# Patient Record
Sex: FEMALE | Race: OTHER | HISPANIC OR LATINO | URBAN - METROPOLITAN AREA
[De-identification: names, ages, dates, MRNs, and addresses within clinical notes are randomized per-mention and may not be internally consistent; named-entity substitution may affect disease eponyms.]

---

## 2019-07-12 PROBLEM — Z00.00 ENCOUNTER FOR PREVENTIVE HEALTH EXAMINATION: Status: ACTIVE | Noted: 2019-07-12

## 2019-07-15 ENCOUNTER — OUTPATIENT (OUTPATIENT)
Dept: OUTPATIENT SERVICES | Facility: HOSPITAL | Age: 64
LOS: 1 days | End: 2019-07-15

## 2019-07-15 VITALS
HEART RATE: 69 BPM | WEIGHT: 138.01 LBS | DIASTOLIC BLOOD PRESSURE: 82 MMHG | RESPIRATION RATE: 14 BRPM | HEIGHT: 58.5 IN | TEMPERATURE: 98 F | OXYGEN SATURATION: 98 % | SYSTOLIC BLOOD PRESSURE: 120 MMHG

## 2019-07-15 DIAGNOSIS — Z87.19 PERSONAL HISTORY OF OTHER DISEASES OF THE DIGESTIVE SYSTEM: Chronic | ICD-10-CM

## 2019-07-15 DIAGNOSIS — Z98.890 OTHER SPECIFIED POSTPROCEDURAL STATES: Chronic | ICD-10-CM

## 2019-07-15 DIAGNOSIS — Z12.11 ENCOUNTER FOR SCREENING FOR MALIGNANT NEOPLASM OF COLON: ICD-10-CM

## 2019-07-15 DIAGNOSIS — R52 PAIN, UNSPECIFIED: ICD-10-CM

## 2019-07-15 LAB
ANION GAP SERPL CALC-SCNC: 8 MMO/L — SIGNIFICANT CHANGE UP (ref 7–14)
BUN SERPL-MCNC: 10 MG/DL — SIGNIFICANT CHANGE UP (ref 7–23)
CALCIUM SERPL-MCNC: 9.3 MG/DL — SIGNIFICANT CHANGE UP (ref 8.4–10.5)
CHLORIDE SERPL-SCNC: 108 MMOL/L — HIGH (ref 98–107)
CO2 SERPL-SCNC: 26 MMOL/L — SIGNIFICANT CHANGE UP (ref 22–31)
CREAT SERPL-MCNC: 0.67 MG/DL — SIGNIFICANT CHANGE UP (ref 0.5–1.3)
GLUCOSE SERPL-MCNC: 82 MG/DL — SIGNIFICANT CHANGE UP (ref 70–99)
HCT VFR BLD CALC: 37.9 % — SIGNIFICANT CHANGE UP (ref 34.5–45)
HGB BLD-MCNC: 11.5 G/DL — SIGNIFICANT CHANGE UP (ref 11.5–15.5)
MCHC RBC-ENTMCNC: 25.2 PG — LOW (ref 27–34)
MCHC RBC-ENTMCNC: 30.3 % — LOW (ref 32–36)
MCV RBC AUTO: 82.9 FL — SIGNIFICANT CHANGE UP (ref 80–100)
NRBC # FLD: 0 K/UL — SIGNIFICANT CHANGE UP (ref 0–0)
PLATELET # BLD AUTO: 244 K/UL — SIGNIFICANT CHANGE UP (ref 150–400)
PMV BLD: 10.5 FL — SIGNIFICANT CHANGE UP (ref 7–13)
POTASSIUM SERPL-MCNC: 4 MMOL/L — SIGNIFICANT CHANGE UP (ref 3.5–5.3)
POTASSIUM SERPL-SCNC: 4 MMOL/L — SIGNIFICANT CHANGE UP (ref 3.5–5.3)
RBC # BLD: 4.57 M/UL — SIGNIFICANT CHANGE UP (ref 3.8–5.2)
RBC # FLD: 14.8 % — HIGH (ref 10.3–14.5)
SODIUM SERPL-SCNC: 142 MMOL/L — SIGNIFICANT CHANGE UP (ref 135–145)
WBC # BLD: 4.7 K/UL — SIGNIFICANT CHANGE UP (ref 3.8–10.5)
WBC # FLD AUTO: 4.7 K/UL — SIGNIFICANT CHANGE UP (ref 3.8–10.5)

## 2019-07-15 RX ORDER — OMEGA-3 ACID ETHYL ESTERS 1 G
0 CAPSULE ORAL
Qty: 0 | Refills: 0 | DISCHARGE

## 2019-07-15 NOTE — H&P PST ADULT - NSICDXPASTMEDICALHX_GEN_ALL_CORE_FT
PAST MEDICAL HISTORY:  Fibroid uterus 20 years agox 2    H/O intestinal obstruction did not require surgery -- had negative colonoscopy

## 2019-07-15 NOTE — H&P PST ADULT - NSICDXPASTSURGICALHX_GEN_ALL_CORE_FT
PAST SURGICAL HISTORY:  H/O induced  x 4    H/O intestinal obstruction negative colonoscopy -- obstructions resolved on its own

## 2019-07-15 NOTE — H&P PST ADULT - HISTORY OF PRESENT ILLNESS
This is a 64 y/o female who presents with h/o colonoscopy 15 years ago due to intestinal obstruction which resolved on its own. Scheduled for colonoscopy on 7-16-19

## 2019-08-10 PROBLEM — Z87.19 PERSONAL HISTORY OF OTHER DISEASES OF THE DIGESTIVE SYSTEM: Chronic | Status: ACTIVE | Noted: 2019-07-15

## 2019-08-10 PROBLEM — D25.9 LEIOMYOMA OF UTERUS, UNSPECIFIED: Chronic | Status: ACTIVE | Noted: 2019-07-15

## 2019-08-13 ENCOUNTER — OUTPATIENT (OUTPATIENT)
Dept: OUTPATIENT SERVICES | Facility: HOSPITAL | Age: 64
LOS: 1 days | Discharge: HOME | End: 2019-08-13

## 2019-08-13 DIAGNOSIS — Z98.890 OTHER SPECIFIED POSTPROCEDURAL STATES: Chronic | ICD-10-CM

## 2019-08-13 DIAGNOSIS — Z12.11 ENCOUNTER FOR SCREENING FOR MALIGNANT NEOPLASM OF COLON: ICD-10-CM

## 2019-08-13 DIAGNOSIS — Z87.19 PERSONAL HISTORY OF OTHER DISEASES OF THE DIGESTIVE SYSTEM: Chronic | ICD-10-CM

## 2025-01-26 ENCOUNTER — NON-APPOINTMENT (OUTPATIENT)
Age: 70
End: 2025-01-26

## 2025-01-26 ENCOUNTER — INPATIENT (INPATIENT)
Facility: HOSPITAL | Age: 70
LOS: 3 days | Discharge: HOME | DRG: 871 | End: 2025-01-30
Attending: STUDENT IN AN ORGANIZED HEALTH CARE EDUCATION/TRAINING PROGRAM | Admitting: HOSPITALIST
Payer: MEDICARE

## 2025-01-26 VITALS
HEART RATE: 93 BPM | TEMPERATURE: 98 F | OXYGEN SATURATION: 97 % | RESPIRATION RATE: 18 BRPM | SYSTOLIC BLOOD PRESSURE: 116 MMHG | WEIGHT: 130.07 LBS | DIASTOLIC BLOOD PRESSURE: 75 MMHG

## 2025-01-26 DIAGNOSIS — Z98.890 OTHER SPECIFIED POSTPROCEDURAL STATES: Chronic | ICD-10-CM

## 2025-01-26 DIAGNOSIS — Z87.19 PERSONAL HISTORY OF OTHER DISEASES OF THE DIGESTIVE SYSTEM: Chronic | ICD-10-CM

## 2025-01-26 DIAGNOSIS — K56.609 UNSPECIFIED INTESTINAL OBSTRUCTION, UNSPECIFIED AS TO PARTIAL VERSUS COMPLETE OBSTRUCTION: Chronic | ICD-10-CM

## 2025-01-26 DIAGNOSIS — A41.9 SEPSIS, UNSPECIFIED ORGANISM: ICD-10-CM

## 2025-01-26 DIAGNOSIS — M54.2 CERVICALGIA: ICD-10-CM

## 2025-01-26 DIAGNOSIS — Z29.9 ENCOUNTER FOR PROPHYLACTIC MEASURES, UNSPECIFIED: ICD-10-CM

## 2025-01-26 LAB
ALBUMIN SERPL ELPH-MCNC: 3.9 G/DL — SIGNIFICANT CHANGE UP (ref 3.3–5)
ALP SERPL-CCNC: 139 U/L — HIGH (ref 40–120)
ALT FLD-CCNC: 51 U/L — HIGH (ref 10–45)
ANION GAP SERPL CALC-SCNC: 17 MMOL/L — SIGNIFICANT CHANGE UP (ref 5–17)
APPEARANCE UR: CLEAR — SIGNIFICANT CHANGE UP
APTT BLD: 35 SEC — SIGNIFICANT CHANGE UP (ref 24.5–35.6)
AST SERPL-CCNC: 48 U/L — HIGH (ref 10–40)
BACTERIA # UR AUTO: NEGATIVE /HPF — SIGNIFICANT CHANGE UP
BASOPHILS # BLD AUTO: 0.03 K/UL — SIGNIFICANT CHANGE UP (ref 0–0.2)
BASOPHILS NFR BLD AUTO: 0.2 % — SIGNIFICANT CHANGE UP (ref 0–2)
BILIRUB SERPL-MCNC: 0.4 MG/DL — SIGNIFICANT CHANGE UP (ref 0.2–1.2)
BILIRUB UR-MCNC: NEGATIVE — SIGNIFICANT CHANGE UP
BUN SERPL-MCNC: 5 MG/DL — LOW (ref 7–23)
CALCIUM SERPL-MCNC: 10.2 MG/DL — SIGNIFICANT CHANGE UP (ref 8.4–10.5)
CAST: 0 /LPF — SIGNIFICANT CHANGE UP (ref 0–4)
CHLORIDE SERPL-SCNC: 97 MMOL/L — SIGNIFICANT CHANGE UP (ref 96–108)
CO2 SERPL-SCNC: 25 MMOL/L — SIGNIFICANT CHANGE UP (ref 22–31)
COLOR SPEC: YELLOW — SIGNIFICANT CHANGE UP
CREAT SERPL-MCNC: 0.6 MG/DL — SIGNIFICANT CHANGE UP (ref 0.5–1.3)
DIFF PNL FLD: ABNORMAL
EGFR: 96 ML/MIN/1.73M2 — SIGNIFICANT CHANGE UP
EOSINOPHIL # BLD AUTO: 0.01 K/UL — SIGNIFICANT CHANGE UP (ref 0–0.5)
EOSINOPHIL NFR BLD AUTO: 0.1 % — SIGNIFICANT CHANGE UP (ref 0–6)
FLUAV AG NPH QL: SIGNIFICANT CHANGE UP
FLUBV AG NPH QL: SIGNIFICANT CHANGE UP
GAS PNL BLDV: SIGNIFICANT CHANGE UP
GLUCOSE SERPL-MCNC: 107 MG/DL — HIGH (ref 70–99)
GLUCOSE UR QL: NEGATIVE MG/DL — SIGNIFICANT CHANGE UP
HCT VFR BLD CALC: 39.7 % — SIGNIFICANT CHANGE UP (ref 34.5–45)
HGB BLD-MCNC: 12.3 G/DL — SIGNIFICANT CHANGE UP (ref 11.5–15.5)
IMM GRANULOCYTES NFR BLD AUTO: 0.5 % — SIGNIFICANT CHANGE UP (ref 0–0.9)
INR BLD: 1.27 RATIO — HIGH (ref 0.85–1.16)
KETONES UR-MCNC: 15 MG/DL
LEUKOCYTE ESTERASE UR-ACNC: ABNORMAL
LIDOCAIN IGE QN: 24 U/L — SIGNIFICANT CHANGE UP (ref 7–60)
LYMPHOCYTES # BLD AUTO: 1.52 K/UL — SIGNIFICANT CHANGE UP (ref 1–3.3)
LYMPHOCYTES # BLD AUTO: 10.1 % — LOW (ref 13–44)
MCHC RBC-ENTMCNC: 25.3 PG — LOW (ref 27–34)
MCHC RBC-ENTMCNC: 31 G/DL — LOW (ref 32–36)
MCV RBC AUTO: 81.7 FL — SIGNIFICANT CHANGE UP (ref 80–100)
MONOCYTES # BLD AUTO: 1.29 K/UL — HIGH (ref 0–0.9)
MONOCYTES NFR BLD AUTO: 8.5 % — SIGNIFICANT CHANGE UP (ref 2–14)
NEUTROPHILS # BLD AUTO: 12.17 K/UL — HIGH (ref 1.8–7.4)
NEUTROPHILS NFR BLD AUTO: 80.6 % — HIGH (ref 43–77)
NITRITE UR-MCNC: NEGATIVE — SIGNIFICANT CHANGE UP
NRBC # BLD: 0 /100 WBCS — SIGNIFICANT CHANGE UP (ref 0–0)
NRBC BLD-RTO: 0 /100 WBCS — SIGNIFICANT CHANGE UP (ref 0–0)
PH UR: 7 — SIGNIFICANT CHANGE UP (ref 5–8)
PLATELET # BLD AUTO: 283 K/UL — SIGNIFICANT CHANGE UP (ref 150–400)
POTASSIUM SERPL-MCNC: 4.1 MMOL/L — SIGNIFICANT CHANGE UP (ref 3.5–5.3)
POTASSIUM SERPL-SCNC: 4.1 MMOL/L — SIGNIFICANT CHANGE UP (ref 3.5–5.3)
PROT SERPL-MCNC: 8.5 G/DL — HIGH (ref 6–8.3)
PROT UR-MCNC: NEGATIVE MG/DL — SIGNIFICANT CHANGE UP
PROTHROM AB SERPL-ACNC: 14.5 SEC — HIGH (ref 9.9–13.4)
RBC # BLD: 4.86 M/UL — SIGNIFICANT CHANGE UP (ref 3.8–5.2)
RBC # FLD: 14.7 % — HIGH (ref 10.3–14.5)
RBC CASTS # UR COMP ASSIST: 4 /HPF — SIGNIFICANT CHANGE UP (ref 0–4)
RSV RNA NPH QL NAA+NON-PROBE: SIGNIFICANT CHANGE UP
SARS-COV-2 RNA SPEC QL NAA+PROBE: SIGNIFICANT CHANGE UP
SODIUM SERPL-SCNC: 139 MMOL/L — SIGNIFICANT CHANGE UP (ref 135–145)
SP GR SPEC: 1.01 — SIGNIFICANT CHANGE UP (ref 1–1.03)
SQUAMOUS # UR AUTO: 0 /HPF — SIGNIFICANT CHANGE UP (ref 0–5)
TROPONIN T, HIGH SENSITIVITY RESULT: <6 NG/L — SIGNIFICANT CHANGE UP (ref 0–51)
UROBILINOGEN FLD QL: 0.2 MG/DL — SIGNIFICANT CHANGE UP (ref 0.2–1)
WBC # BLD: 15.1 K/UL — HIGH (ref 3.8–10.5)
WBC # FLD AUTO: 15.1 K/UL — HIGH (ref 3.8–10.5)
WBC UR QL: 1 /HPF — SIGNIFICANT CHANGE UP (ref 0–5)

## 2025-01-26 PROCEDURE — 99285 EMERGENCY DEPT VISIT HI MDM: CPT | Mod: 25

## 2025-01-26 PROCEDURE — 99223 1ST HOSP IP/OBS HIGH 75: CPT | Mod: GC

## 2025-01-26 PROCEDURE — 62270 DX LMBR SPI PNXR: CPT

## 2025-01-26 RX ORDER — PIPERACILLIN SODIUM AND TAZOBACTAM SODIUM 2; 250 G/50ML; MG/50ML
3.38 INJECTION, POWDER, FOR SOLUTION INTRAVENOUS ONCE
Refills: 0 | Status: DISCONTINUED | OUTPATIENT
Start: 2025-01-27 | End: 2025-01-27

## 2025-01-26 RX ORDER — KETOROLAC TROMETHAMINE 10 MG
15 TABLET ORAL ONCE
Refills: 0 | Status: DISCONTINUED | OUTPATIENT
Start: 2025-01-26 | End: 2025-01-26

## 2025-01-26 RX ORDER — PIPERACILLIN SODIUM AND TAZOBACTAM SODIUM 2; 250 G/50ML; MG/50ML
3.38 INJECTION, POWDER, FOR SOLUTION INTRAVENOUS ONCE
Refills: 0 | Status: COMPLETED | OUTPATIENT
Start: 2025-01-27 | End: 2025-01-27

## 2025-01-26 RX ORDER — LIDOCAINE HYDROCHLORIDE 10 MG/ML
20 INJECTION EPIDURAL; INFILTRATION; INTRACAUDAL ONCE
Refills: 0 | Status: COMPLETED | OUTPATIENT
Start: 2025-01-26 | End: 2025-01-26

## 2025-01-26 RX ORDER — VANCOMYCIN HYDROCHLORIDE 50 MG/ML
1000 KIT ORAL ONCE
Refills: 0 | Status: COMPLETED | OUTPATIENT
Start: 2025-01-26 | End: 2025-01-26

## 2025-01-26 RX ORDER — ENOXAPARIN SODIUM 100 MG/ML
40 INJECTION SUBCUTANEOUS EVERY 24 HOURS
Refills: 0 | Status: DISCONTINUED | OUTPATIENT
Start: 2025-01-26 | End: 2025-01-30

## 2025-01-26 RX ORDER — CEFTRIAXONE 250 MG/1
2000 INJECTION, POWDER, FOR SOLUTION INTRAMUSCULAR; INTRAVENOUS ONCE
Refills: 0 | Status: COMPLETED | OUTPATIENT
Start: 2025-01-26 | End: 2025-01-26

## 2025-01-26 RX ORDER — VANCOMYCIN HYDROCHLORIDE 50 MG/ML
1000 KIT ORAL EVERY 12 HOURS
Refills: 0 | Status: DISCONTINUED | OUTPATIENT
Start: 2025-01-27 | End: 2025-01-27

## 2025-01-26 RX ORDER — BACTERIOSTATIC SODIUM CHLORIDE 0.9 %
1000 VIAL (ML) INJECTION ONCE
Refills: 0 | Status: COMPLETED | OUTPATIENT
Start: 2025-01-26 | End: 2025-01-26

## 2025-01-26 RX ORDER — PIPERACILLIN SODIUM AND TAZOBACTAM SODIUM 2; 250 G/50ML; MG/50ML
3.38 INJECTION, POWDER, FOR SOLUTION INTRAVENOUS EVERY 8 HOURS
Refills: 0 | Status: DISCONTINUED | OUTPATIENT
Start: 2025-01-27 | End: 2025-01-27

## 2025-01-26 RX ORDER — ACETAMINOPHEN 160 MG/5ML
1000 SUSPENSION ORAL ONCE
Refills: 0 | Status: COMPLETED | OUTPATIENT
Start: 2025-01-26 | End: 2025-01-26

## 2025-01-26 RX ORDER — ACETAMINOPHEN 160 MG/5ML
650 SUSPENSION ORAL EVERY 6 HOURS
Refills: 0 | Status: DISCONTINUED | OUTPATIENT
Start: 2025-01-26 | End: 2025-01-27

## 2025-01-26 RX ORDER — DEXAMETHASONE SODIUM PHOSPHATE 4 MG/ML
10 INJECTION, SOLUTION INTRA-ARTICULAR; INTRALESIONAL; INTRAMUSCULAR; INTRAVENOUS; SOFT TISSUE ONCE
Refills: 0 | Status: COMPLETED | OUTPATIENT
Start: 2025-01-26 | End: 2025-01-26

## 2025-01-26 RX ADMIN — Medication 1000 MILLILITER(S): at 13:28

## 2025-01-26 RX ADMIN — Medication 200 GRAM(S): at 14:30

## 2025-01-26 RX ADMIN — LIDOCAINE HYDROCHLORIDE 20 MILLILITER(S): 10 INJECTION EPIDURAL; INFILTRATION; INTRACAUDAL at 19:55

## 2025-01-26 RX ADMIN — Medication 15 MILLIGRAM(S): at 13:28

## 2025-01-26 RX ADMIN — CEFTRIAXONE 100 MILLIGRAM(S): 250 INJECTION, POWDER, FOR SOLUTION INTRAMUSCULAR; INTRAVENOUS at 13:28

## 2025-01-26 RX ADMIN — VANCOMYCIN HYDROCHLORIDE 250 MILLIGRAM(S): KIT at 15:06

## 2025-01-26 RX ADMIN — DEXAMETHASONE SODIUM PHOSPHATE 100 MILLIGRAM(S): 4 INJECTION, SOLUTION INTRA-ARTICULAR; INTRALESIONAL; INTRAMUSCULAR; INTRAVENOUS; SOFT TISSUE at 16:43

## 2025-01-26 NOTE — ED ADULT NURSE REASSESSMENT NOTE - NS ED NURSE REASSESS COMMENT FT1
Report received from DUSTY Diallo . Pt AAOx4, NAD, resp nonlabored, skin warm/dry, resting comfortably in bed with family at bedside. Pt denies headache, dizziness, chest pain, palpitations, SOB, abd pain, n/v/d, urinary symptoms, fevers, chills, weakness at this time. Pt awaiting dispo . Safety maintained.

## 2025-01-26 NOTE — H&P ADULT - PROBLEM SELECTOR PLAN 1
Initially tachycardic to 90s and WBC count of 15.1 - neutrophil predominant; meets criteria for Sepsis but clinically stable and unclear source on infection  -Recent URI w/ cough; Full RVP negative, but CT showing LLL consolidation and R upper and middle mediastinal cystic mass   -CT A&P showing Bilobed R adnexal mass   -CT head and spine without evidence of intracranial infection, but evaluation w/ CT is limited   -s/p Ampicillin, Ceftriaxone, and Vancomycin + Dexamethasone and unsuccessful LP attempt in the ED.   -Given patient is at baseline mental status, no nuchal rigidity, no photophobia or visual disturbances, improving headaches, and negative brudzinski sign, low concern for meningitis at this time.   -Patient's symptoms possibly 2/2 viral infection not covered by RVP or superimposed bacterial PNA vs. intraabdominal infection  at R adnexal site.    Plan:   -Empiric Coverage w/ Vancomycin and Zosyn (1/26- )  -Will hold off on further empiric meningitis coverage or steroids.   -f/u BCx X2  -f/u EBV PCR, CMV PCR, Hepatitis Panel, HIV, MRSA swab, Procalcitonin Initially tachycardic to 90s and WBC count of 15.1 - neutrophil predominant; meets criteria for Sepsis but clinically stable and unclear source on infection  -Recent URI w/ cough; Full RVP negative  -s/p Ampicillin, Ceftriaxone, and Vancomycin + Dexamethasone and unsuccessful LP attempt in the ED.   -Given patient is at baseline mental status, no nuchal rigidity, no photophobia or visual disturbances, improving headaches, and negative brudzinski sign, low concern for meningitis at this time.   -Patient's symptoms possibly 2/2 viral infection not covered by RVP or superimposed bacterial PNA vs. intraabdominal infection  at R adnexal site.    Plan:   -Empiric Coverage Zosyn (1/26- )  -Will hold off on further empiric meningitis coverage or steroids.   -f/u BCx X2 UCx  -f/u EBV PCR, CMV PCR, Hepatitis Panel, HIV, MRSA swab, Procalcitonin

## 2025-01-26 NOTE — H&P ADULT - NSHPPHYSICALEXAM_GEN_ALL_CORE
Vital Signs Last 24 Hrs  T(C): 36.8 (26 Jan 2025 19:32), Max: 37.9 (26 Jan 2025 14:04)  T(F): 98.2 (26 Jan 2025 19:32), Max: 100.2 (26 Jan 2025 14:04)  HR: 84 (26 Jan 2025 19:32) (79 - 93)  BP: 139/72 (26 Jan 2025 19:32) (116/75 - 140/69)  BP(mean): 91 (26 Jan 2025 19:32) (89 - 91)  RR: 18 (26 Jan 2025 19:32) (18 - 18)  SpO2: 95% (26 Jan 2025 19:32) (95% - 99%)    Parameters below as of 26 Jan 2025 19:32  Patient On (Oxygen Delivery Method): room air    General: Patient in NAD  HEENT: NCAT, EOMI, no oral lesions, PERRL, no nystagmus   CV: S1+S2, no m/r/g appreciated   Lungs: No respiratory distress, crackles at left lung base, significant coughing w/ deep inspiration    Abd: Soft, nontender, no guarding, no rebound tenderness, + bowel sounds   : No suprapubic tenderness  Neuro: AOx4, no nuchal rigidity noted, ROM of head fully intact. Negative Brudzinski sign, 5/5 strength in all muscle groups upper and lower extremities, patellar reflexes 2+   Ext: No cyanosis, no edema  Skin: No rash, no phlebitis

## 2025-01-26 NOTE — H&P ADULT - PROBLEM SELECTOR PLAN 3
DVT PPx: Lovenox   Diet: Regular   PT/OT: PT consulted   Code Status: Full Code   Dispo: Pending CT showing LLL consolidation and R upper and middle mediastinal cystic mass  -f/u w/ a CT chest w/ Contrast to further characterize lesion

## 2025-01-26 NOTE — ED PROVIDER NOTE - OBJECTIVE STATEMENT
69-year-old female with no significant past medical history presents to ED for occipital neck pain with associated lower back pain with generalized weakness x 1 week.  Endorses associated difficulty ambulating due to weakness. Patient seen by UC and advised to go to ED for concern for meningitis with a fever (Tmax 101).  Given ibuprofen for symptomatic improvement.  Ordered initial persistent dry cough x 3 weeks; treated with promethazine that has since resolved.  No recent trauma or heavy lifting.  No recent travel or sick contacts.  Denies headaches, dizziness, nausea, vomiting, chest pain, SOB, abdominal pain, urinary symptoms, change in bowel movement, numbness/tingling.  Denies red flag signs including saddle anesthesia or urinary incontinence/retention.  Son at bedside states no change in her mental status.

## 2025-01-26 NOTE — H&P ADULT - NSHPSOCIALHISTORY_GEN_ALL_CORE
No recent known sick contacts.  Within past 2 years travelled to [place], [place], [place]. Has not gotten sick at those areas or after returning home

## 2025-01-26 NOTE — H&P ADULT - PROBLEM SELECTOR PLAN 5
Transaminases mildly elevated w/ , AST/ALT 48/51   -Less likely infectious, but f/u Hepatitis titers   -Continue monitoring LFTs

## 2025-01-26 NOTE — H&P ADULT - PROBLEM SELECTOR PLAN 2
-Headaches 9/10 severity over past 5 days w/ fevers and iso recent upper respiratory illness.   -Described as band-like radiating from PABLO temples back to occiput and down her neck, associated with neck stiffness and lumbar pain.   -Initially concerned for meningitis, but w/o brudzinski sign or limited neck ROM   -Recently prescribed Promethazine for URI symptoms by PCP  -CT head and spine non-con w/o hydrocephalus, enhancing lesions or cord compression.  -Ddx includes headaches iso URI vs. headache 2/2 antimuscarinic effects of promethazine vs. headache and neck pain due to muscle strain. Meningitis is not likely given clinically appears well w/o confusion, no photophobia or visual disturbances.    Plan:   -Pain management with PRN Tylenol   -Defer LP for now and c/w infectious workup as above.

## 2025-01-26 NOTE — H&P ADULT - HISTORY OF PRESENT ILLNESS
69F with no significant PMH coming in to ED for 2 weeks of subjective fevers and headache associated with lower back pain. The patient states that 2 weeks ago she started having some cough and subjective fevers and chills. Her respiratory symptoms improved but after ~10 days she started experiencing headache that she describes as starting from the temples bilaterally and radiating down towards the occipital region into the neck. She also noticed lower back pain around this time. She denies any vision changes, photophobia, dizziness, nausea or vomiting at this time. She states her neck does feel a little stiff but her range of motion is intact. She has felt chills but did not measure her temperature at home. She notes that while she was having these upper respiratory symptoms, she saw her PCP who prescribed Promethazine which she took for 3 days before headaches started. She describes her headaches as 9/10 and took tylenol for headache which helped. She has a hx of migraines in the past and has seen a Neurologist for it and was never prescribed any medications for it. When asked about the quality of her previous migraine headaches, she was unable to describe the character of her headaches.  69F with no significant PMH coming in to ED for 2 weeks of subjective fevers and headache associated with lower back pain. The patient states that 2 weeks ago she started having some cough and subjective fevers and chills. Her respiratory symptoms improved but after ~10 days she started experiencing headache that she describes as starting from the temples bilaterally and radiating down towards the occipital region into the neck. She also noticed lower back pain around this time. She denies any vision changes, photophobia, dizziness, nausea or vomiting at this time. She states her neck does feel a little stiff but her range of motion is intact. She has felt chills but did not measure her temperature at home. She notes that while she was having these upper respiratory symptoms, she saw her PCP who prescribed Promethazine which she took for 3 days before headaches started. She describes her headaches as 9/10 and took tylenol for headache which helped. She saw a Neurologist in the past for what she describes as migraine headaches but she was never prescribed any medications for it. When asked about the quality of her previous migraine headaches, she was unable to describe the character of her headaches and she is unable to state whether her current headache feels similar to those episodes. Son was at bedside and states that the patient's mental status appears to be at her baseline.   At the ED her vitals were significant for elevated temperature to 100.2, HR in 70s-90s, stable BP and saturating well on RA.   WBC  was elevated to 15.1 w/ neutrophil predominance. Brudzinski sign was negative, and patient was AOx4 but given stiffness and pain of neck and concerns for meningitis, the ED attempted an LP but was unsuccessful. The patient was admitted to medicine.

## 2025-01-26 NOTE — H&P ADULT - TIME BILLING
Independently obtaining a history, performing a physical examination, discussing the plan with the patient, ordering medications/tests, documenting clinical information, and coordinating care.    79 minutes spent on total encounter which excludes time spent teaching.

## 2025-01-26 NOTE — ED ADULT TRIAGE NOTE - CHIEF COMPLAINT QUOTE
Referred to ED for r/o mengingitis, fever and cough x 2 weeks a/w neck stiffness and headache.  Tylenol given at 1045 AM.

## 2025-01-26 NOTE — H&P ADULT - PROBLEM SELECTOR PLAN 6
Recent URI w/ persistent cough on deep inspiration.   Full RVP negative   CT showing potential LLL atelectasis vs. consolidation     Plan:   Nebusal Q12   Incentive Spirometer

## 2025-01-26 NOTE — ED PROVIDER NOTE - ATTENDING CONTRIBUTION TO CARE
I, Pedro Luis Weller MD, Emergency Medicine Attending Physician, personally saw and examined the patient and I personally made/approve the management plan and take responsibility for the patient management.    MDM: 69-year-old female is otherwise healthy who presents with occipital headache, neck pain, lower back pain, generalized weakness for 1 week.  Patient was seen at urgent care and had fever measured at 101F, was given ibuprofen prior to referring to the ED due to concern for meningitis.    ROS: denies trauma, chest pain, shortness of breath, abdominal pain, nausea, vomiting, diarrhea, constipation, urinary retention, incontinence of bowel or bladder, saddle anesthesia, lower extremity weakness or numbness.    On examination, patient with stable vitals, borderline febrile rectally, uncomfortable appearing.  Head NCAT, eyes PERRL.  Diffuse C-spine tenderness paraspinal, bilateral.  Decreased range of motion of neck with both flexion, extension, and rotation.  Cardiac examination RRR, lungs CTAB with no W/R/R.  Abdomen soft, non-tender and non-distended, no rebound, no guarding, no rigidity. No CVA tenderness.  L-SPINE: (+) Significant tenderness to the midline of the lumbar spine around L2-3, but there is no erythema, deformity, swelling. L2-S2 with normal 5/5 motor strength and normal sensation.  No hyperreflexia or clonus.  Neurovascularly intact in all 4 extremities with 5/5 strength, normal sensation, equal pulses and brisk capillary refill, soft compartments.  Cranial nerves III-XII intact, no pronator drift, normal speech and gait.    Will obtain CT Head to evaluate for acute intracranial pathology.  Will obtain CT C and L-spine to evaluate for acute cervical and lumbar spine pathology.  Will obtain chest x-ray to evaluate for acute cardiopulmonary pathology.  Will obtain CT Chest to evaluate for acute thoracic pathology.  Will obtain labs to evaluate for hematologic disorder, metabolic derangements, hepatic and renal function, and screen for infection.    Given patient with fever (measured temperature at urgent care and given antipyretic, and still with borderline temperature rectal), with headache and nuchal rigidity, concern for possible meningitis.  However due to age and midline tenderness, will obtain CT imaging prior to lumbar puncture.  Called to expedite CT at 1:15 PM.  Will give empiric antibiotics and dexamethasone at this time.    My independent interpretation of the EKG shows:  Normal sinus rhythm with rate of 79 bpm, leftward axis deviation, no ST elevations or depressions.  QTc 428 I, Pedro Luis Weller MD, Emergency Medicine Attending Physician, personally saw and examined the patient and I personally made/approve the management plan and take responsibility for the patient management.    MDM: 69-year-old female is otherwise healthy who presents with occipital headache, neck pain, lower back pain, generalized weakness for 1 week.  Patient was seen at urgent care and had fever measured at 101F, was given ibuprofen prior to referring to the ED due to concern for meningitis.    ROS: denies trauma, chest pain, shortness of breath, abdominal pain, nausea, vomiting, diarrhea, constipation, urinary retention, incontinence of bowel or bladder, saddle anesthesia, lower extremity weakness or numbness.    On examination, patient with stable vitals, borderline febrile rectally, uncomfortable appearing.  Head NCAT, eyes PERRL.  Diffuse C-spine tenderness paraspinal, bilateral.  Decreased range of motion of neck with both flexion, extension, and rotation.  Cardiac examination RRR, lungs CTAB with no W/R/R.  Abdomen soft, non-tender and non-distended, no rebound, no guarding, no rigidity. No CVA tenderness.  L-SPINE: (+) Significant tenderness to the midline of the lumbar spine around L2-3, but there is no erythema, deformity, swelling. L2-S2 with normal 5/5 motor strength and normal sensation.  No hyperreflexia or clonus.  Neurovascularly intact in all 4 extremities with 5/5 strength, normal sensation, equal pulses and brisk capillary refill, soft compartments.  Cranial nerves III-XII intact, no pronator drift, normal speech and gait.    Will obtain CT Head to evaluate for acute intracranial pathology.  Will obtain CT C and L-spine to evaluate for acute cervical and lumbar spine pathology.  Will obtain chest x-ray to evaluate for acute cardiopulmonary pathology.  Will obtain CT Chest to evaluate for acute thoracic pathology.  Will obtain labs to evaluate for hematologic disorder, metabolic derangements, hepatic and renal function, and screen for infection.    Given patient with fever (measured temperature at urgent care and given antipyretic, and still with borderline temperature rectal), with headache and nuchal rigidity, concern for possible meningitis.  However due to age and midline tenderness, will obtain CT imaging prior to lumbar puncture.  Called to expedite CT at 1:15 PM.  Will give empiric antibiotics and dexamethasone at this time.    My independent interpretation of the EKG shows:  Normal sinus rhythm with rate of 79 bpm, leftward axis deviation, no ST elevations or depressions.  QTc 428    I, Pedro Luis Weller MD, Emergency Medicine Attending Physician, personally saw and examined the patient and I personally made/approve the management plan and take responsibility for the patient management.    MDM: 39-year-old female who is otherwise healthy who presents with progressive sensory symptoms.  Patient was seen in the ED on 1/20/2025 for similar symptoms, had labs and CT head that were nonactionable.  Since she was discharged, patient reports inability to taste and smell, and difficulty with sensing hot/cold temperatures.  Patient has an appointment to see a neurologist tomorrow.    ROS: denies trauma, fever, chest pain, shortness of breath, cough, abdominal pain, nausea, vomiting, diarrhea, constipation, obstipation, dysuria, vaginal bleeding, headache, vision changes, numbness, weakness, neck stiffness    On examination, patient with stable vitals, well-appearing, in no acute distress.  Head NCAT, eyes PERRL.  Neck is nontender, supple, no meningismus, negative Kernig and Brezinski.  Cardiac examination RRR, lungs CTAB with no W/R/R.  Abdomen soft, non-tender and non-distended, no rebound, no guarding, no rigidity. No CVA tenderness.  Neurovascularly intact in all 4 extremities with 5/5 strength, normal sensation, equal pulses and brisk capillary refill, soft compartments.  Cranial nerves III-XII intact, no pronator drift, normal speech and gait.    Reviewed labs and imaging from 1/24/25.  Low utility for repeat labs given recently completely -2 days ago.  No emergent indication to repeat CT head given symptoms that do not have laterality, and has completely benign neurologic examination, but would benefit from neurology consultation.    Patient was seen by neurology who recommended CDU for MRI, and also recommended further lab testing.  The patient would benefit from observation in the CDU for further monitoring with frequent reassessments, Q4 hour vital signs, MRI imaging, neurology recommendations, follow-up on labs.

## 2025-01-26 NOTE — ED PROVIDER NOTE - CLINICAL SUMMARY MEDICAL DECISION MAKING FREE TEXT BOX
Afebrile hemodynamically stable female presents to ED for neck/lower back pain x 1 week with a Tmax fever of 101 °F.  Sent in by  for further evaluation and concerns of meningitis.  Physical exam notable for normal EOM, PERRLA.  No focal deficits.  Patient with limited neck mobility 2/2 pain.  Negative Brudzinsi sign.  Soft nondistended nontender abdomen.  5 out of 5 strength lower extremity.  Normal sensation, neurovascular intact.  Midline tenderness L2 without step-off.  Ordered basic labs, flu/COVID, UA with culture, CT head/neck/chest/lumbar to rule out fracture versus ICH versus infectious etiology versus viral URI versus electrolyte abnormality versus UTI infection.  Given pain meds, fluids, reassess.

## 2025-01-26 NOTE — ED ADULT NURSE REASSESSMENT NOTE - NS ED NURSE REASSESS COMMENT FT1
Pt is awake, alert, and speaking in full coherent sentences. VS Stable. NAD noted. Pt is resting comfortably in stretcher, side rails up, call bell within reach, bed in lowest position. Pt is pending dispo. As per pt's wife request RN placed condom cath on pt, pt tolerated well. .

## 2025-01-26 NOTE — H&P ADULT - NSHPLABSRESULTS_GEN_ALL_CORE
LABS:                        12.3   15.10 )-----------( 283      ( 2025 12:57 )             39.7         139  |  97  |  5[L]  ----------------------------<  107[H]  4.1   |  25  |  0.60    Ca    10.2      2025 12:57    TPro  8.5[H]  /  Alb  3.9  /  TBili  0.4  /  DBili  x   /  AST  48[H]  /  ALT  51[H]  /  AlkPhos  139[H]      PT/INR - ( 2025 12:57 )   PT: 14.5 sec;   INR: 1.27 ratio         PTT - ( 2025 12:57 )  PTT:35.0 sec      CARDIAC MARKERS ( 2025 12:57 )  <6 ng/L / x     / x     / x     / x     / x          Urinalysis Basic - ( 2025 13:58 )    Color: Yellow / Appearance: Clear / S.007 / pH: x  Gluc: x / Ketone: 15 mg/dL  / Bili: Negative / Urobili: 0.2 mg/dL   Blood: x / Protein: Negative mg/dL / Nitrite: Negative   Leuk Esterase: Trace / RBC: 4 /HPF / WBC 1 /HPF   Sq Epi: x / Non Sq Epi: 0 /HPF / Bacteria: Negative /HPF            EKG and RADIOLOGY:     Most recent EKG and Images Personally Reviewed    EKG w/ normal rate and regular rhythm no signs of ischemia     CT A&P and Chest showing LLL consolidation; atelectasis vs. superinfection, and R upper and middle mediastinal cystic mass   Bilobed R adnexal mass --> Pelvic ultrasound

## 2025-01-26 NOTE — ED ADULT NURSE NOTE - CAS TRG GENERAL NORM CIRC DET
CBC/INR/PT/PTT/CXR/UGI/BMP/Urinalysis/EKG Strong peripheral pulses CXR/INR/PT/PTT/CBC/UGI, Covid 19 not detected/BMP/Urinalysis/EKG

## 2025-01-26 NOTE — ED ADULT NURSE NOTE - OBJECTIVE STATEMENT
1y Female complaining of headache. Referred to ED for r/o meningitis, fever and cough x 2 weeks a/w neck stiffness and headache. p[t seen at  Tylenol given at 1045 AM.

## 2025-01-26 NOTE — H&P ADULT - NSICDXFAMILYHX_GEN_ALL_CORE_FT
FAMILY HISTORY:  Father  Still living? Unknown  FH: hypertension, Age at diagnosis: Age Unknown    Mother  Still living? Unknown  FH: Alzheimers disease, Age at diagnosis: Age Unknown  FH: hypertension, Age at diagnosis: Age Unknown

## 2025-01-26 NOTE — ED PROVIDER NOTE - PHYSICAL EXAMINATION
Gen: NAD, non-toxic appearing  Head: normal appearing  HEENT: normal conjunctiva, oral mucosa dry, normal EOM, PERRLA.  No focal deficits.   Lung: no respiratory distress, speaking in full sentences, CTA b/l     CV: regular rate and rhythm, no murmurs  Abd: soft, non distended, non tender   MSK: no visible deformities  Neuro: No focal deficits, AAOx3, limited neck mobility 2/2 pain.  Negative Brudzinsi sign. 5 out of 5 strength lower extremity.  Normal sensation, neurovascular intact.  Midline tenderness L2 without step-off.   Skin: Warm  Psych: normal affect

## 2025-01-26 NOTE — H&P ADULT - NSHPREVIEWOFSYSTEMS_GEN_ALL_CORE
Constitutional: Subjective fevers, chills, no weight loss or fatigue.   Skin: No rash, no phlebitis	  Eyes: No discharge	  ENMT: No sore throat, oral thrush, ulcers or exudate  Respiratory: cough, SOB  Cardiovascular:  No chest pain, palpitations or edema   Gastrointestinal: No pain, nausea, vomiting, diarrhea or constipation	  Genitourinary: No dysuria, discharge or flank pain  MSK: No arthralgias, lumbar pain noted   Neurological: 9/10 HA, and neck stiffness, no weakness, no seizures, no AMS

## 2025-01-26 NOTE — ED PROVIDER NOTE - PROGRESS NOTE DETAILS
Rene SALGADO, PGY-2;  Received signout on this patient and previous provider.  6-year-old female without significant past medical history presenting with neck pain, lower back pain, fever.  Review of labs indicate elevated white blood cell count, CMP with slightly elevated LFTs, RVP negative CT imaging without any intracranial hemorrhage or fractures.  Chest x-ray no focal consolidation.  Patient will require admission to hospital for further workup. Rene SALGADO, PGY-2;  Discussed with hospitalist, will admit ot their service.

## 2025-01-26 NOTE — H&P ADULT - ATTENDING COMMENTS
Patient seen and observed at bedside. Patient with negative kernig and brudzinski sign, denies headache. I have very low suspicion for meningitis on exam. patient with chronic cough for 3 weeks and unable to take deep breath due to chest tightness. On CT Chest, peripheral opacities noted that are likely atelectasis but may have superimposed infection. Will empirically cover with Zosyn. Thus far, viral panels have been negative. CT Chest also concerning for cystic mediastinal mass, will re-evaluate mass and opacities with repeat CT Chest w/contrast. Will also obtain pelvic US for R adnexal mass. Headache and neck pain have since resolved, will monitor for recurrence.

## 2025-01-26 NOTE — ED ADULT NURSE REASSESSMENT NOTE - NS ED NURSE REASSESS COMMENT FT1
Pt is awake, alert, and speaking in full coherent sentences. VS Stable. NAD noted. Pt is resting comfortably in stretcher, side rails up, and call bell within reach. Comfort and safety provided, bed in lowest position and wheels locked. Pt is admitted and awaiting bed.

## 2025-01-27 DIAGNOSIS — N94.89 OTHER SPECIFIED CONDITIONS ASSOCIATED WITH FEMALE GENITAL ORGANS AND MENSTRUAL CYCLE: ICD-10-CM

## 2025-01-27 DIAGNOSIS — J06.9 ACUTE UPPER RESPIRATORY INFECTION, UNSPECIFIED: ICD-10-CM

## 2025-01-27 DIAGNOSIS — R74.01 ELEVATION OF LEVELS OF LIVER TRANSAMINASE LEVELS: ICD-10-CM

## 2025-01-27 DIAGNOSIS — J98.59 OTHER DISEASES OF MEDIASTINUM, NOT ELSEWHERE CLASSIFIED: ICD-10-CM

## 2025-01-27 LAB
ALBUMIN SERPL ELPH-MCNC: 3.3 G/DL — SIGNIFICANT CHANGE UP (ref 3.3–5)
ALP SERPL-CCNC: 115 U/L — SIGNIFICANT CHANGE UP (ref 40–120)
ALT FLD-CCNC: 33 U/L — SIGNIFICANT CHANGE UP (ref 10–45)
ANION GAP SERPL CALC-SCNC: 12 MMOL/L — SIGNIFICANT CHANGE UP (ref 5–17)
APTT BLD: 31.7 SEC — SIGNIFICANT CHANGE UP (ref 24.5–35.6)
AST SERPL-CCNC: 23 U/L — SIGNIFICANT CHANGE UP (ref 10–40)
BASOPHILS # BLD AUTO: 0.01 K/UL — SIGNIFICANT CHANGE UP (ref 0–0.2)
BASOPHILS NFR BLD AUTO: 0.1 % — SIGNIFICANT CHANGE UP (ref 0–2)
BILIRUB SERPL-MCNC: 0.3 MG/DL — SIGNIFICANT CHANGE UP (ref 0.2–1.2)
BUN SERPL-MCNC: 8 MG/DL — SIGNIFICANT CHANGE UP (ref 7–23)
CALCIUM SERPL-MCNC: 9.2 MG/DL — SIGNIFICANT CHANGE UP (ref 8.4–10.5)
CHLORIDE SERPL-SCNC: 102 MMOL/L — SIGNIFICANT CHANGE UP (ref 96–108)
CMV DNA CSF QL NAA+PROBE: SIGNIFICANT CHANGE UP IU/ML
CMV DNA SPEC NAA+PROBE-LOG#: SIGNIFICANT CHANGE UP LOG10IU/ML
CO2 SERPL-SCNC: 25 MMOL/L — SIGNIFICANT CHANGE UP (ref 22–31)
CREAT SERPL-MCNC: 0.57 MG/DL — SIGNIFICANT CHANGE UP (ref 0.5–1.3)
CULTURE RESULTS: SIGNIFICANT CHANGE UP
EBV DNA SERPL NAA+PROBE-ACNC: SIGNIFICANT CHANGE UP IU/ML
EBVPCR LOG: SIGNIFICANT CHANGE UP LOG10IU/ML
EGFR: 98 ML/MIN/1.73M2 — SIGNIFICANT CHANGE UP
EOSINOPHIL # BLD AUTO: 0 K/UL — SIGNIFICANT CHANGE UP (ref 0–0.5)
EOSINOPHIL NFR BLD AUTO: 0 % — SIGNIFICANT CHANGE UP (ref 0–6)
GLUCOSE SERPL-MCNC: 206 MG/DL — HIGH (ref 70–99)
HAV IGM SER-ACNC: SIGNIFICANT CHANGE UP
HBV CORE IGM SER-ACNC: SIGNIFICANT CHANGE UP
HBV SURFACE AG SER-ACNC: SIGNIFICANT CHANGE UP
HCT VFR BLD CALC: 33.6 % — LOW (ref 34.5–45)
HCV AB S/CO SERPL IA: 0.28 S/CO — SIGNIFICANT CHANGE UP (ref 0–0.99)
HCV AB SERPL-IMP: SIGNIFICANT CHANGE UP
HERPES SIMPLEX VIRUS 1/2 SURVEILLANCE PCR SOURCE: SIGNIFICANT CHANGE UP
HGB BLD-MCNC: 10.6 G/DL — LOW (ref 11.5–15.5)
HIV 1+2 AB+HIV1 P24 AG SERPL QL IA: SIGNIFICANT CHANGE UP
HSV1+2 DNA SPEC QL NAA+PROBE: SIGNIFICANT CHANGE UP
IMM GRANULOCYTES NFR BLD AUTO: 0.5 % — SIGNIFICANT CHANGE UP (ref 0–0.9)
INR BLD: 1.28 RATIO — HIGH (ref 0.85–1.16)
LYMPHOCYTES # BLD AUTO: 0.58 K/UL — LOW (ref 1–3.3)
LYMPHOCYTES # BLD AUTO: 5.4 % — LOW (ref 13–44)
MAGNESIUM SERPL-MCNC: 2.2 MG/DL — SIGNIFICANT CHANGE UP (ref 1.6–2.6)
MCHC RBC-ENTMCNC: 25.2 PG — LOW (ref 27–34)
MCHC RBC-ENTMCNC: 31.5 G/DL — LOW (ref 32–36)
MCV RBC AUTO: 79.8 FL — LOW (ref 80–100)
MONOCYTES # BLD AUTO: 0.23 K/UL — SIGNIFICANT CHANGE UP (ref 0–0.9)
MONOCYTES NFR BLD AUTO: 2.1 % — SIGNIFICANT CHANGE UP (ref 2–14)
MRSA PCR RESULT.: SIGNIFICANT CHANGE UP
NEUTROPHILS # BLD AUTO: 9.89 K/UL — HIGH (ref 1.8–7.4)
NEUTROPHILS NFR BLD AUTO: 91.9 % — HIGH (ref 43–77)
NRBC # BLD: 0 /100 WBCS — SIGNIFICANT CHANGE UP (ref 0–0)
NRBC BLD-RTO: 0 /100 WBCS — SIGNIFICANT CHANGE UP (ref 0–0)
PHOSPHATE SERPL-MCNC: 4.6 MG/DL — HIGH (ref 2.5–4.5)
PLATELET # BLD AUTO: 282 K/UL — SIGNIFICANT CHANGE UP (ref 150–400)
POTASSIUM SERPL-MCNC: 3.9 MMOL/L — SIGNIFICANT CHANGE UP (ref 3.5–5.3)
POTASSIUM SERPL-SCNC: 3.9 MMOL/L — SIGNIFICANT CHANGE UP (ref 3.5–5.3)
PROCALCITONIN SERPL-MCNC: 0.08 NG/ML — SIGNIFICANT CHANGE UP (ref 0.02–0.1)
PROT SERPL-MCNC: 7.2 G/DL — SIGNIFICANT CHANGE UP (ref 6–8.3)
PROTHROM AB SERPL-ACNC: 14.6 SEC — HIGH (ref 9.9–13.4)
RBC # BLD: 4.21 M/UL — SIGNIFICANT CHANGE UP (ref 3.8–5.2)
RBC # FLD: 14.9 % — HIGH (ref 10.3–14.5)
S AUREUS DNA NOSE QL NAA+PROBE: DETECTED
SODIUM SERPL-SCNC: 139 MMOL/L — SIGNIFICANT CHANGE UP (ref 135–145)
SPECIMEN SOURCE: SIGNIFICANT CHANGE UP
WBC # BLD: 10.76 K/UL — HIGH (ref 3.8–10.5)
WBC # FLD AUTO: 10.76 K/UL — HIGH (ref 3.8–10.5)

## 2025-01-27 PROCEDURE — 71260 CT THORAX DX C+: CPT | Mod: 26

## 2025-01-27 PROCEDURE — 99233 SBSQ HOSP IP/OBS HIGH 50: CPT

## 2025-01-27 RX ORDER — METOCLOPRAMIDE 10 MG/1
10 TABLET ORAL ONCE
Refills: 0 | Status: COMPLETED | OUTPATIENT
Start: 2025-01-27 | End: 2025-01-27

## 2025-01-27 RX ORDER — AZITHROMYCIN DIHYDRATE 500 MG/1
500 TABLET, FILM COATED ORAL EVERY 24 HOURS
Refills: 0 | Status: COMPLETED | OUTPATIENT
Start: 2025-01-27 | End: 2025-01-29

## 2025-01-27 RX ORDER — GABAPENTIN 800 MG/1
100 TABLET ORAL EVERY 8 HOURS
Refills: 0 | Status: DISCONTINUED | OUTPATIENT
Start: 2025-01-27 | End: 2025-01-30

## 2025-01-27 RX ORDER — CEFTRIAXONE 250 MG/1
1000 INJECTION, POWDER, FOR SOLUTION INTRAMUSCULAR; INTRAVENOUS ONCE
Refills: 0 | Status: COMPLETED | OUTPATIENT
Start: 2025-01-27 | End: 2025-01-27

## 2025-01-27 RX ORDER — CEFTRIAXONE 250 MG/1
1000 INJECTION, POWDER, FOR SOLUTION INTRAMUSCULAR; INTRAVENOUS EVERY 24 HOURS
Refills: 0 | Status: DISCONTINUED | OUTPATIENT
Start: 2025-01-28 | End: 2025-01-30

## 2025-01-27 RX ORDER — ACETAMINOPHEN 160 MG/5ML
975 SUSPENSION ORAL EVERY 8 HOURS
Refills: 0 | Status: DISCONTINUED | OUTPATIENT
Start: 2025-01-27 | End: 2025-01-30

## 2025-01-27 RX ORDER — METHOCARBAMOL 500 MG
750 TABLET ORAL THREE TIMES A DAY
Refills: 0 | Status: DISCONTINUED | OUTPATIENT
Start: 2025-01-27 | End: 2025-01-30

## 2025-01-27 RX ORDER — CEFTRIAXONE 250 MG/1
INJECTION, POWDER, FOR SOLUTION INTRAMUSCULAR; INTRAVENOUS
Refills: 0 | Status: DISCONTINUED | OUTPATIENT
Start: 2025-01-27 | End: 2025-01-30

## 2025-01-27 RX ORDER — BACTERIOSTATIC SODIUM CHLORIDE 0.9 %
4 VIAL (ML) INJECTION EVERY 12 HOURS
Refills: 0 | Status: DISCONTINUED | OUTPATIENT
Start: 2025-01-27 | End: 2025-01-30

## 2025-01-27 RX ORDER — KETOROLAC TROMETHAMINE 10 MG
15 TABLET ORAL EVERY 6 HOURS
Refills: 0 | Status: DISCONTINUED | OUTPATIENT
Start: 2025-01-27 | End: 2025-01-28

## 2025-01-27 RX ADMIN — Medication 15 MILLIGRAM(S): at 18:03

## 2025-01-27 RX ADMIN — Medication 15 MILLIGRAM(S): at 23:06

## 2025-01-27 RX ADMIN — AZITHROMYCIN DIHYDRATE 500 MILLIGRAM(S): 500 TABLET, FILM COATED ORAL at 11:06

## 2025-01-27 RX ADMIN — ACETAMINOPHEN 975 MILLIGRAM(S): 160 SUSPENSION ORAL at 22:26

## 2025-01-27 RX ADMIN — CEFTRIAXONE 100 MILLIGRAM(S): 250 INJECTION, POWDER, FOR SOLUTION INTRAMUSCULAR; INTRAVENOUS at 13:22

## 2025-01-27 RX ADMIN — METOCLOPRAMIDE 10 MILLIGRAM(S): 10 TABLET ORAL at 11:07

## 2025-01-27 RX ADMIN — Medication 15 MILLIGRAM(S): at 11:07

## 2025-01-27 RX ADMIN — PIPERACILLIN SODIUM AND TAZOBACTAM SODIUM 25 GRAM(S): 2; 250 INJECTION, POWDER, FOR SOLUTION INTRAVENOUS at 03:22

## 2025-01-27 RX ADMIN — Medication 4 MILLILITER(S): at 07:24

## 2025-01-27 RX ADMIN — GABAPENTIN 100 MILLIGRAM(S): 800 TABLET ORAL at 14:56

## 2025-01-27 RX ADMIN — Medication 750 MILLIGRAM(S): at 22:26

## 2025-01-27 RX ADMIN — PIPERACILLIN SODIUM AND TAZOBACTAM SODIUM 200 GRAM(S): 2; 250 INJECTION, POWDER, FOR SOLUTION INTRAVENOUS at 01:00

## 2025-01-27 RX ADMIN — GABAPENTIN 100 MILLIGRAM(S): 800 TABLET ORAL at 22:25

## 2025-01-27 RX ADMIN — Medication 750 MILLIGRAM(S): at 14:56

## 2025-01-27 NOTE — ED ADULT NURSE REASSESSMENT NOTE - NS ED NURSE REASSESS COMMENT FT1
Assumed care of patient from DUSTY Meza. Pt afebrile at this time. Denies any pain. Vital signs remain stable. Safety and comfort measures maintained.

## 2025-01-27 NOTE — ED ADULT NURSE REASSESSMENT NOTE - NS ED NURSE REASSESS COMMENT FT1
Report taken from LULU MONTANO. Pt introduced to oncoming RN and updated on plan of care. pt is A&OX4, VSS, Call bell in reach, pt educated on use. Bed locked and in lowest position. Denies any needs or complaints at this time. report given to floor unit, pending transport for dispo

## 2025-01-27 NOTE — PHYSICAL THERAPY INITIAL EVALUATION ADULT - GAIT DEVIATIONS NOTED, PT EVAL
decreased dani/increased time in double stance/decreased step length/decreased stride length/decreased weight-shifting ability

## 2025-01-27 NOTE — PATIENT PROFILE ADULT - HOME ACCESSIBILITY CONCERNS
Patient calls stating that  has tested positive for COVID on Thursday. Patient states that she had been asymptomatic until today has stuffy nose and start of an earache. Patient reports no fever, no other symptoms. Patient wants to know if Dr. Holden would order her a Covid 19 test due to her many health problems. Writer informs patient that we are directing patients to the community testing sites. Patient was given information on local sites, and information about filling out screenings on line to see if she would be eligible for testing. Writer told patient to continue monitoring symptoms, and is encouraged to call back office if symptoms worsen or if she feels as though she needs to be seen.    none

## 2025-01-27 NOTE — ED ADULT NURSE REASSESSMENT NOTE - NS ED NURSE REASSESS COMMENT FT1
Patient complaining of slight burning and pain at IV site. Upon assessment, RFA area swollen. Compartment remains soft, no pain upon palpation. No erythema noted. PIV removed. Hot packs applied. New 20G in RAC placed.

## 2025-01-27 NOTE — PHYSICAL THERAPY INITIAL EVALUATION ADULT - ADDITIONAL COMMENTS
Pt lives in a  with her brother +steps to enter with handrail. PTA Pt was ambulating (I) Without an AD and was (I) With all ADLs

## 2025-01-27 NOTE — PHYSICAL THERAPY INITIAL EVALUATION ADULT - PERTINENT HX OF CURRENT PROBLEM, REHAB EVAL
70 yo F with no significant PMH coming in to ED for 2 weeks of subjective fevers and headache associated with lower back pain. The patient states that 2 weeks ago she started having some cough and subjective fevers and chills. Her respiratory symptoms improved but after ~10 days she started experiencing headache that she describes as starting from the temples bilaterally and radiating down towards the occipital region into the neck. She also noticed lower back pain around this time. She denies any vision changes, photophobia, dizziness, nausea or vomiting at this time. She states her neck does feel a little stiff but her range of motion is intact. She has felt chills but did not measure her temperature at home. She notes that while she was having these upper respiratory symptoms, she saw her PCP who prescribed Promethazine which she took for 3 days before headaches started. She describes her headaches as 9/10 and took tylenol for headache which helped. She saw a Neurologist in the past for what she describes as migraine headaches but she was never prescribed any medications for it. When asked about the quality of her previous migraine headaches, she was unable to describe the character of her headaches and she is unable to state whether her current headache feels similar to those episodes. Son was at bedside and states that the patient's mental status appears to be at her baseline. At the ED her vitals were significant for elevated temperature to 100.2, HR in 70s-90s, stable BP and saturating well on RA. BC  was elevated to 15.1 w/ neutrophil predominance. Brudzinski sign was negative, and patient was AOx4 but given stiffness and pain of neck and concerns for meningitis, the ED attempted an LP but was unsuccessful. The patient was admitted to medicine. CT L spine No acute fracture or traumatic subluxation. Multi-level degenerative changes. CTH: No hydrocephalus, acute intracranial hemorrhage, or mass effect. Please note that meningitis, cerebritis, and encephalitis are poorly evaluated on non-contrast CT brain. CT Chest 1/26: CT Chest: Subsegmental consolidative opacities in the left lower lobe favored to represent atelectasis over infection, however superinfection in the collapsed lung cannot be excluded. Cystic mass in the upper and right middle mediastinum is not well evaluated on this noncontrast study. Differential diagnosis is broad including thymic cyst and thymic cystic mass,  pericardial cyst among other etiology. This could be better evaluated with post contrast imaging. No acute findings in the abdomen or pelvis. Bilobar right adnexal mass. Recommend further evaluation with pelvic ultrasound. Note that the appendix is not well-visualized however there is no   pericecal inflammation.

## 2025-01-27 NOTE — PHYSICAL THERAPY INITIAL EVALUATION ADULT - PLANNED THERAPY INTERVENTIONS, PT EVAL
GOAL: Pt will ascend/descend (8) steps (I) with U HR and step over step pattern in 4 weeks./balance training/bed mobility training/gait training/transfer training

## 2025-01-27 NOTE — ED ADULT NURSE REASSESSMENT NOTE - NS ED NURSE REASSESS COMMENT FT1
Pt is awake, alert, and speaking in full coherent sentences. VS Stable. NAD noted. Pt is resting comfortably in stretcher, side rails up, call bell within reach, and bed in lowest position. Pt is admitted and awaiting bed. Hospitalist at bedside.

## 2025-01-27 NOTE — ED ADULT NURSE REASSESSMENT NOTE - NS ED NURSE REASSESS COMMENT FT1
Pt provided with meal and drinks. Pt tolerated well. Pt is awake, alert, and speaking in full coherent sentences. VS Stable. NAD noted. Pt is resting comfortably in stretcher, side rails up, and call bell within reach. Comfort and safety provided, bed in lowest position. Pt is admitted and awaiting bed.

## 2025-01-27 NOTE — ED ADULT NURSE REASSESSMENT NOTE - NS ED NURSE REASSESS COMMENT FT1
Report received from Rocío MONTANO. Patient VSS. Patient provided with sandwich. PT at bedside evaluating patient.

## 2025-01-28 ENCOUNTER — TRANSCRIPTION ENCOUNTER (OUTPATIENT)
Age: 70
End: 2025-01-28

## 2025-01-28 LAB
ANION GAP SERPL CALC-SCNC: 13 MMOL/L — SIGNIFICANT CHANGE UP (ref 5–17)
BUN SERPL-MCNC: 17 MG/DL — SIGNIFICANT CHANGE UP (ref 7–23)
CALCIUM SERPL-MCNC: 8.8 MG/DL — SIGNIFICANT CHANGE UP (ref 8.4–10.5)
CHLORIDE SERPL-SCNC: 104 MMOL/L — SIGNIFICANT CHANGE UP (ref 96–108)
CO2 SERPL-SCNC: 25 MMOL/L — SIGNIFICANT CHANGE UP (ref 22–31)
CREAT SERPL-MCNC: 0.63 MG/DL — SIGNIFICANT CHANGE UP (ref 0.5–1.3)
EGFR: 95 ML/MIN/1.73M2 — SIGNIFICANT CHANGE UP
GLUCOSE SERPL-MCNC: 76 MG/DL — SIGNIFICANT CHANGE UP (ref 70–99)
HCT VFR BLD CALC: 30.3 % — LOW (ref 34.5–45)
HGB BLD-MCNC: 9.6 G/DL — LOW (ref 11.5–15.5)
MAGNESIUM SERPL-MCNC: 2.2 MG/DL — SIGNIFICANT CHANGE UP (ref 1.6–2.6)
MCHC RBC-ENTMCNC: 25.4 PG — LOW (ref 27–34)
MCHC RBC-ENTMCNC: 31.7 G/DL — LOW (ref 32–36)
MCV RBC AUTO: 80.2 FL — SIGNIFICANT CHANGE UP (ref 80–100)
NRBC # BLD: 0 /100 WBCS — SIGNIFICANT CHANGE UP (ref 0–0)
NRBC BLD-RTO: 0 /100 WBCS — SIGNIFICANT CHANGE UP (ref 0–0)
PHOSPHATE SERPL-MCNC: 4.1 MG/DL — SIGNIFICANT CHANGE UP (ref 2.5–4.5)
PLATELET # BLD AUTO: 292 K/UL — SIGNIFICANT CHANGE UP (ref 150–400)
POTASSIUM SERPL-MCNC: 3.4 MMOL/L — LOW (ref 3.5–5.3)
POTASSIUM SERPL-SCNC: 3.4 MMOL/L — LOW (ref 3.5–5.3)
RBC # BLD: 3.78 M/UL — LOW (ref 3.8–5.2)
RBC # FLD: 15 % — HIGH (ref 10.3–14.5)
SODIUM SERPL-SCNC: 142 MMOL/L — SIGNIFICANT CHANGE UP (ref 135–145)
WBC # BLD: 9.35 K/UL — SIGNIFICANT CHANGE UP (ref 3.8–10.5)
WBC # FLD AUTO: 9.35 K/UL — SIGNIFICANT CHANGE UP (ref 3.8–10.5)

## 2025-01-28 PROCEDURE — 72197 MRI PELVIS W/O & W/DYE: CPT | Mod: 26

## 2025-01-28 PROCEDURE — 99233 SBSQ HOSP IP/OBS HIGH 50: CPT

## 2025-01-28 PROCEDURE — 76830 TRANSVAGINAL US NON-OB: CPT | Mod: 26

## 2025-01-28 PROCEDURE — 76856 US EXAM PELVIC COMPLETE: CPT | Mod: 26

## 2025-01-28 RX ADMIN — ACETAMINOPHEN 975 MILLIGRAM(S): 160 SUSPENSION ORAL at 15:18

## 2025-01-28 RX ADMIN — GABAPENTIN 100 MILLIGRAM(S): 800 TABLET ORAL at 22:17

## 2025-01-28 RX ADMIN — CEFTRIAXONE 100 MILLIGRAM(S): 250 INJECTION, POWDER, FOR SOLUTION INTRAMUSCULAR; INTRAVENOUS at 12:35

## 2025-01-28 RX ADMIN — AZITHROMYCIN DIHYDRATE 500 MILLIGRAM(S): 500 TABLET, FILM COATED ORAL at 12:35

## 2025-01-28 RX ADMIN — GABAPENTIN 100 MILLIGRAM(S): 800 TABLET ORAL at 05:19

## 2025-01-28 RX ADMIN — Medication 750 MILLIGRAM(S): at 05:19

## 2025-01-28 RX ADMIN — Medication 750 MILLIGRAM(S): at 22:15

## 2025-01-28 RX ADMIN — ACETAMINOPHEN 975 MILLIGRAM(S): 160 SUSPENSION ORAL at 05:19

## 2025-01-28 RX ADMIN — GABAPENTIN 100 MILLIGRAM(S): 800 TABLET ORAL at 14:18

## 2025-01-28 RX ADMIN — Medication 15 MILLIGRAM(S): at 05:19

## 2025-01-28 RX ADMIN — ACETAMINOPHEN 975 MILLIGRAM(S): 160 SUSPENSION ORAL at 14:18

## 2025-01-28 RX ADMIN — ACETAMINOPHEN 975 MILLIGRAM(S): 160 SUSPENSION ORAL at 22:16

## 2025-01-28 RX ADMIN — Medication 4 MILLILITER(S): at 05:19

## 2025-01-28 RX ADMIN — Medication 750 MILLIGRAM(S): at 14:18

## 2025-01-28 NOTE — POST DISCHARGE NOTE - NOTIFICATION:
Notified Dr. Catie Gagnon of patient's CT findings.  Place an order in [place] to "Consults- Cancer Care Direct Navigation" for assistance in outpatient care.

## 2025-01-28 NOTE — DISCHARGE NOTE PROVIDER - NSDCCPTREATMENT_GEN_ALL_CORE_FT
PRINCIPAL PROCEDURE  Procedure: CT chest w con  Findings and Treatment:   AIRWAYS/LUNGS/PLEURA: Patent central airways. Subsegmental atelectasis in   the bilateral lower lobes and lingula. Trace left pleural effusion.  MEDIASTINUM AND AILEEN: Homogeneously hypodense lesion in the mediastinum   with Hounsfield units of 10 (image 30, series 301) and measuring 3.6 cm   AP by 2.9 cm across by 2.5 cm craniocaudal ([id number], series 301 and   coronal series image 76). This lesion is located posterior to the SVC and   to the right of the trachea. There are no discrete enhancing nodules   within this mass identified.  No mediastinal, hilar, or axillary.  VESSELS: Within normal limits.  HEART: Heart size is normal. No pericardial effusion.  VISUALIZED UPPER ABDOMEN: Hepatic subcentimeter hypodensity too small to   characterize.  CHEST WALL AND BONES: Degenerative changes of the spine.  IMPRESSION:  Hypodense lesion in the mediastinum measuring 3.6 cm likely represents a   mediastinal cyst.  Consider CT chest or MRI chest follow-up in 6 months to reassess   stability.  --- End of Report ---      SECONDARY PROCEDURE  Procedure: MR pelvis wo/w con  Findings and Treatment: FINDINGS:  UTERUS: Supracervical hysterectomy.  RIGHT OVARY: Not right ovary measuring 5.5 x3.0 x 3.7 cm. There are two   simple cysts, each measuring 3.8 cm and 2.2 cm. In addition, there is a   T2 hypointense lesion measuring 2.9 x 2.1 x 2.5 cm without diffusion   restriction and demonstrating minimal enhancement.  LEFT OVARY: Age-appropriate atrophy.  ADNEXA: Within normal limits.  BLADDER: Within normal limits.  LYMPH NODES: No pelvic lymphadenopathy.  VISUALIZED PORTIONS:  ABDOMINAL ORGANS: Within normal limits.  BOWEL: Within normal limits.  PERITONEUM: No ascites.  VESSELS: Within normal limits.  ABDOMINAL WALL: Within normal limits.  BONES: Within normal limits.  IMPRESSION:  A right ovarian T2 hypointense lesion measuring 2.9 x 2.1 x 2.5 cm   without diffusion restriction and demonstrating minimal enhancement.   Differentials include ovarian fibroma/fibrothecoma. Less likely broad   ligament fibroid.  --- End of Report ---

## 2025-01-28 NOTE — DISCHARGE NOTE PROVIDER - PROVIDER TOKENS
PROVIDER:[TOKEN:[[id number]],FOLLOWUP:[1-3 days]] PROVIDER:[TOKEN:[[id number]],FOLLOWUP:[1-3 days]],PROVIDER:[TOKEN:[[id number]]]

## 2025-01-28 NOTE — DISCHARGE NOTE PROVIDER - CARE PROVIDERS DIRECT ADDRESSES
,DirectAddress_Unknown ,DirectAddress_Unknown,[email or web address]

## 2025-01-28 NOTE — PROVIDER CONTACT NOTE (OTHER) - BACKGROUND
P/w fevers, back pain and headache radiating to occipital region/neck. Hx of migraines and bowel obstruction. Dx headaches and fever r/o sepsis, and adenexal mass.

## 2025-01-28 NOTE — DISCHARGE NOTE PROVIDER - NSDCMRMEDTOKEN_GEN_ALL_CORE_FT
ascorbic acid 120 mg oral tablet, chewable: 2 tab(s) chewed once a day  cholecalciferol 25 mcg (1000 intl units) oral tablet: 1 tab(s) orally once a day  omega-3 polyunsaturated fatty acids: 1 tab(s) once a day  promethazine 12.5 mg oral tablet: 1 tab(s) orally every 6 hours   acetaminophen 325 mg oral tablet: 3 tab(s) orally every 8 hours  methocarbamol 750 mg oral tablet: 1 tab(s) orally 3 times a day

## 2025-01-28 NOTE — DISCHARGE NOTE PROVIDER - HOSPITAL COURSE
HPI:  69F with no significant PMH coming in to ED for 2 weeks of subjective fevers and headache associated with lower back pain. The patient states that 2 weeks ago she started having some cough and subjective fevers and chills. Her respiratory symptoms improved but after ~10 days she started experiencing headache that she describes as starting from the temples bilaterally and radiating down towards the occipital region into the neck. She also noticed lower back pain around this time. She denies any vision changes, photophobia, dizziness, nausea or vomiting at this time. She states her neck does feel a little stiff but her range of motion is intact. She has felt chills but did not measure her temperature at home. She notes that while she was having these upper respiratory symptoms, she saw her PCP who prescribed Promethazine which she took for 3 days before headaches started. She describes her headaches as 9/10 and took tylenol for headache which helped. She saw a Neurologist in the past for what she describes as migraine headaches but she was never prescribed any medications for it. When asked about the quality of her previous migraine headaches, she was unable to describe the character of her headaches and she is unable to state whether her current headache feels similar to those episodes. Son was at bedside and states that the patient's mental status appears to be at her baseline.   At the ED her vitals were significant for elevated temperature to 100.2, HR in 70s-90s, stable BP and saturating well on RA.   WBC  was elevated to 15.1 w/ neutrophil predominance. Brudzinski sign was negative, and patient was AOx4 but given stiffness and pain of neck and concerns for meningitis, the ED attempted an LP but was unsuccessful. The patient was admitted to medicine.  (26 Jan 2025 23:02)    Hospital Course: Patient admitted for headaches and fevers and found to have pneumonia . She had a lumbar puncture which she tolerated well.   Over-all 69 year old female presenting with headache, found to have sepsis likely secondary to pneumonia.    Initially tachycardic to 90s and WBC count of 15.1 - neutrophil predominant; meets criteria for sepsis but clinically stable and unclear source on infection. Had a recent URI w/ cough; full RVP negative. CT chest negative, but lung exam and clinical story consistent with pneumonia. Sepsis markers improving with antibiotics. Doubt meninigitis at this time given patient is at baseline mental status, no nuchal rigidity, no photophobia or visual disturbances, improving headaches, and negative Brudzinski sign.   PAtient received  Ceftriaxone 1 gram daily and Azithromycin 500 mg daily. Patient with tension headache suspect secondary to either (a) inflammatory disease from acute illness or (b) degenerative disc disease. Treated with  Tylenol 975 mg q8h Toradol 15 mg q6h for 24 hours, start on 1/27/25 Reglan 10 mg IV x 1, monitor for improvement    Gabapentin 100 mg q8h  and Methocarbamol 750 mg PO three times a day. Her headache improved subsequently.  Patient had a ct scan of the chest that show a  Mediastinal mass with LLL consolidation and R upper and middle mediastinal cystic mass. Likely incidental.  - Outpatient thoracic surgery follow up for monitoring. Patient was also found to have  Adnexal mass.  CT A&P showing Bilobed R adnexal mass.   - F/u w/ a pelvic ultrasound to further characterize lesion ;Outpatient gynecology follow up for monitoring. Labs show mildly elevated liver function test most likely from sepsis. Follow up outpatient for improvement .      Important Medication Changes and Reason: see medication list    Active or Pending Issues Requiring Follow-up:  Appointments after discharge: PCP, gynecology, CT surgery       Advanced Directives:   [ x] Full code  [ ] DNR  [ ] Hospice    Discharge Diagnoses:  Headache  Pneumonia  Mediastinal mass  Adnexal mass         HPI:  69F with no significant PMH coming in to ED for 2 weeks of subjective fevers and headache associated with lower back pain. The patient states that 2 weeks ago she started having some cough and subjective fevers and chills. Her respiratory symptoms improved but after ~10 days she started experiencing headache that she describes as starting from the temples bilaterally and radiating down towards the occipital region into the neck. She also noticed lower back pain around this time. She denies any vision changes, photophobia, dizziness, nausea or vomiting at this time. She states her neck does feel a little stiff but her range of motion is intact. She has felt chills but did not measure her temperature at home. She notes that while she was having these upper respiratory symptoms, she saw her PCP who prescribed Promethazine which she took for 3 days before headaches started. She describes her headaches as 9/10 and took tylenol for headache which helped. She saw a Neurologist in the past for what she describes as migraine headaches but she was never prescribed any medications for it. When asked about the quality of her previous migraine headaches, she was unable to describe the character of her headaches and she is unable to state whether her current headache feels similar to those episodes. Son was at bedside and states that the patient's mental status appears to be at her baseline.   At the ED her vitals were significant for elevated temperature to 100.2, HR in 70s-90s, stable BP and saturating well on RA.   WBC  was elevated to 15.1 w/ neutrophil predominance. Brudzinski sign was negative, and patient was AOx4 but given stiffness and pain of neck and concerns for meningitis, the ED attempted an LP but was unsuccessful. The patient was admitted to medicine.  (26 Jan 2025 23:02)    Hospital Course: Patient admitted for headaches and fevers and found to have pneumonia . She had a lumbar puncture which she tolerated well.   Over-all 69 year old female presenting with headache, found to have sepsis likely secondary to pneumonia.    Initially tachycardic to 90s and WBC count of 15.1 - neutrophil predominant; meets criteria for sepsis but clinically stable and unclear source on infection. Had a recent URI w/ cough; full RVP negative. CT chest negative, but lung exam and clinical story consistent with pneumonia. Sepsis markers improving with antibiotics. Doubt meninigitis at this time given patient is at baseline mental status, no nuchal rigidity, no photophobia or visual disturbances, improving headaches, and negative Brudzinski sign.   PAtient received  Ceftriaxone 1 gram daily and Azithromycin 500 mg daily. Patient with tension headache suspect secondary to either (a) inflammatory disease from acute illness or (b) degenerative disc disease. Treated with  Tylenol 975 mg q8h Toradol 15 mg q6h for 24 hours, start on 1/27/25 Reglan 10 mg IV x 1, monitor for improvement    Gabapentin 100 mg q8h  and Methocarbamol 750 mg PO three times a day. Her headache improved subsequently.  Patient had a ct scan of the chest that show a  Mediastinal mass with LLL consolidation and R upper and middle mediastinal cystic mass. Likely incidental.  - Outpatient thoracic surgery follow up for monitoring. Patient was also found to have  Adnexal mass.  CT A&P showing Bilobed R adnexal mass.   - F/u w/ a pelvic ultrasound to further characterize lesion ;Outpatient gynecology follow up for monitoring. Labs show mildly elevated liver function test most likely from sepsis. Follow up outpatient for improvement .    Underwent MRI of A/P which showed right ovarian T2 hypointense lesion measuring 2.9x2.1x2.5cm without diffusion restriction and demonstrating minimal enhancement. Seen by GYN who recommended outpatient follow up for discussion on surgical resection. Tumor markers were sent, but pending at time of discharge. Markers to be further followed by GYN as outpatient.                Important Medication Changes and Reason: see medication list    Active or Pending Issues Requiring Follow-up:  Appointments after discharge: PCP, gynecology, CT surgery       Advanced Directives:   [ x] Full code  [ ] DNR  [ ] Hospice    Discharge Diagnoses:  Headache  Pneumonia  Mediastinal mass  Adnexal mass         HPI:  69F with no significant PMH coming in to ED for 2 weeks of subjective fevers and headache associated with lower back pain. The patient states that 2 weeks ago she started having some cough and subjective fevers and chills. Her respiratory symptoms improved but after ~10 days she started experiencing headache that she describes as starting from the temples bilaterally and radiating down towards the occipital region into the neck. She also noticed lower back pain around this time. She denies any vision changes, photophobia, dizziness, nausea or vomiting at this time. She states her neck does feel a little stiff but her range of motion is intact. She has felt chills but did not measure her temperature at home. She notes that while she was having these upper respiratory symptoms, she saw her PCP who prescribed Promethazine which she took for 3 days before headaches started. She describes her headaches as 9/10 and took tylenol for headache which helped. She saw a Neurologist in the past for what she describes as migraine headaches but she was never prescribed any medications for it. When asked about the quality of her previous migraine headaches, she was unable to describe the character of her headaches and she is unable to state whether her current headache feels similar to those episodes. Son was at bedside and states that the patient's mental status appears to be at her baseline.   At the ED her vitals were significant for elevated temperature to 100.2, HR in 70s-90s, stable BP and saturating well on RA.   WBC  was elevated to 15.1 w/ neutrophil predominance. Brudzinski sign was negative, and patient was AOx4 but given stiffness and pain of neck and concerns for meningitis, the ED attempted an LP but was unsuccessful. The patient was admitted to medicine.  (26 Jan 2025 23:02)    Hospital Course: Patient admitted for headaches and fevers and found to have pneumonia . She had a lumbar puncture which she tolerated well.   Over-all 69 year old female presenting with headache, found to have sepsis likely secondary to pneumonia.    Initially tachycardic to 90s and WBC count of 15.1 - neutrophil predominant; meets criteria for sepsis but clinically stable and unclear source on infection. Had a recent URI w/ cough; full RVP negative. CT chest negative, but lung exam and clinical story consistent with pneumonia. Sepsis markers improving with antibiotics. Doubt meninigitis at this time given patient is at baseline mental status, no nuchal rigidity, no photophobia or visual disturbances, improving headaches, and negative Brudzinski sign.   PAtient received  Ceftriaxone 1 gram daily and Azithromycin 500 mg daily. Patient with tension headache suspect secondary to either (a) inflammatory disease from acute illness or (b) degenerative disc disease. Treated with  Tylenol 975 mg q8h Toradol 15 mg q6h for 24 hours, start on 1/27/25 Reglan 10 mg IV x 1, monitor for improvement    Gabapentin 100 mg q8h  and Methocarbamol 750 mg PO three times a day. Her headache improved subsequently.  Patient had a ct scan of the chest that show a  Mediastinal mass with LLL consolidation and R upper and middle mediastinal cystic mass. Likely incidental.  - Outpatient thoracic surgery follow up for monitoring. Patient was also found to have  Adnexal mass.  CT A&P showing Bilobed R adnexal mass.   - F/u w/ a pelvic ultrasound to further characterize lesion ;Outpatient gynecology follow up for monitoring. Labs show mildly elevated liver function test most likely from sepsis. Follow up outpatient for improvement .    Underwent MRI of A/P which showed right ovarian T2 hypointense lesion measuring 2.9x2.1x2.5cm without diffusion restriction and demonstrating minimal enhancement. Seen by GYN who recommended outpatient follow up for discussion on surgical resection. Tumor markers were sent, but pending at time of discharge. Markers to be further followed by GYN as outpatient.      At day of discharge patient medically improved and endorsing headache better. Seen by attending physician at day of discharge and approved for discharge home. Patient will follow up with GYN, thoracic, neuro and PCP as an outpatient            Important Medication Changes and Reason: see medication list    Active or Pending Issues Requiring Follow-up:  Appointments after discharge: PCP, gynecology, CT surgery       Advanced Directives:   [ x] Full code  [ ] DNR  [ ] Hospice    Discharge Diagnoses:  Headache  Pneumonia  Mediastinal mass  Adnexal mass

## 2025-01-28 NOTE — DISCHARGE NOTE PROVIDER - NSDCCPCAREPLAN_GEN_ALL_CORE_FT
PRINCIPAL DISCHARGE DIAGNOSIS  Diagnosis: Pneumonia  Assessment and Plan of Treatment: Pneumonia is a lung infection that can cause a fever, cough, and trouble breathing.  Continue all antibiotics as ordered until complete.  Nutrition is important, eat small frequent meals.  Get lots of rest and drink fluids.  Call your health care provider upon arrival home from hospital and make a follow up appointment for one week.  If your cough worsens, you develop fever greater than 101', you have shaking chills, a fast heartbeat, trouble breathing and/or feel your are breathing much faster than usual, call your healthcare provider.  Make sure you wash your hands frequently.        SECONDARY DISCHARGE DIAGNOSES  Diagnosis: Mediastinal mass  Assessment and Plan of Treatment: outpatient with cardiothoracic    Diagnosis: Adnexal mass  Assessment and Plan of Treatment: please follow up outpatient with GYN for evaluation and care    Diagnosis: Sepsis  Assessment and Plan of Treatment: Pneumonia is a lung infection that can cause a fever, cough, and trouble breathing.  Continue all antibiotics as ordered until complete.  Nutrition is important, eat small frequent meals.  Get lots of rest and drink fluids.  Call your health care provider upon arrival home from hospital and make a follow up appointment for one week.  If your cough worsens, you develop fever greater than 101', you have shaking chills, a fast heartbeat, trouble breathing and/or feel your are breathing much faster than usual, call your healthcare provider.  Make sure you wash your hands frequently.       PRINCIPAL DISCHARGE DIAGNOSIS  Diagnosis: Pneumonia  Assessment and Plan of Treatment: Pneumonia is a lung infection that can cause a fever, cough, and trouble breathing. You were treated with antibiotics and improved. Please follow up with your primary care doctor upon discharge        SECONDARY DISCHARGE DIAGNOSES  Diagnosis: Sepsis  Assessment and Plan of Treatment: Pneumonia is a lung infection that can cause a fever, cough, and trouble breathing.  Continue all antibiotics as ordered until complete.  Nutrition is important, eat small frequent meals.  Get lots of rest and drink fluids.  Call your health care provider upon arrival home from hospital and make a follow up appointment for one week.  If your cough worsens, you develop fever greater than 101', you have shaking chills, a fast heartbeat, trouble breathing and/or feel your are breathing much faster than usual, call your healthcare provider.  Make sure you wash your hands frequently.      Diagnosis: Adnexal mass  Assessment and Plan of Treatment: please follow up outpatient with GYN for evaluation and care    Diagnosis: Mediastinal mass  Assessment and Plan of Treatment: outpatient with cardiothoracic     PRINCIPAL DISCHARGE DIAGNOSIS  Diagnosis: Sepsis  Assessment and Plan of Treatment: There was initial concern for you having an infection called meningitis, but that was quickly ruled out. There was also concern for possible pneumonia on your chest imaging. You were treated with antibiotics and improved. Please follow up with your primary care doctor after discharge      SECONDARY DISCHARGE DIAGNOSES  Diagnosis: Adnexal mass  Assessment and Plan of Treatment: You were found to have a right sided mass on your ovary on imaging, for which you were seen by the gynecology team. They recommended some blood work which was pending at discharge. You should follow up with gynecology for further evaluation. We have provided a number for you to call.    Diagnosis: Mediastinal mass  Assessment and Plan of Treatment: You were found to incidentally have a mass in your mediastinum on imaging of your chest. We recommend following up with thoracic surgery after discharge and have provided a number for you to call    Diagnosis: Tension type headache  Assessment and Plan of Treatment: You presented to the hospital with headache, which initially given other finds was concerning for meningitis. With further testing this was ruled out. You were given medications to help the headaches and you started to improve. We think the headaches could be due to degenerative disc disease or possibly some inflammation in the setting of your acute illness. We sent some medications to your pharmacy to continue to help. We also included a number for the neurologists for further evaluation and treatment.     PRINCIPAL DISCHARGE DIAGNOSIS  Diagnosis: Sepsis  Assessment and Plan of Treatment: There was initial concern for you having an infection called meningitis, but that was quickly ruled out. There was also concern for possible pneumonia on your chest imaging. You were treated with antibiotics and improved. Please follow up with your primary care doctor after discharge      SECONDARY DISCHARGE DIAGNOSES  Diagnosis: Adnexal mass  Assessment and Plan of Treatment: You were found to have a right sided mass on your ovary on imaging, for which you were seen by the gynecology team. They recommended some blood work which was pending at discharge. You should follow up with gynecology for further evaluation. We have provided a number for you to call.    Diagnosis: Mediastinal mass  Assessment and Plan of Treatment: You were found to incidentally have a mass in your mediastinum on imaging of your chest. We recommend following up with thoracic surgery after discharge and have provided a number for you to call    Diagnosis: Tension type headache  Assessment and Plan of Treatment: You presented to the hospital with headache, which initially given other finds was concerning for meningitis. With further testing this was ruled out. You were given medications to help the headaches and you started to improve. We think the headaches could be due to degenerative disc disease or possibly some inflammation in the setting of your acute illness. Imaging of the head and neck was unrevealing for anything acute. We sent some medications to your pharmacy to continue to help. We also included a number for the neurologists for further evaluation and treatment.

## 2025-01-28 NOTE — DISCHARGE NOTE PROVIDER - CARE PROVIDER_API CALL
Ximena Armstrong [place]  Internal Medicine  [address] [place] [address]  Phone: [phone]  Fax: [phone]  Follow Up Time: 1-3 days   Ximena Armstrong [place]  Internal Medicine  [address] [place] [address]  Phone: [phone]  Fax: [phone]  Follow Up Time: 1-3 days    Dede Lea  Thoracic Surgery  [address] [place] [address], [address] [place] [address]  Phone: [phone]  Fax: [phone]  Follow Up Time:

## 2025-01-28 NOTE — DISCHARGE NOTE PROVIDER - NSFOLLOWUPCLINICS_GEN_ALL_ED_FT
[facility]  Neurology  [address] [place] [address] - [address]rd [place] [address]  Phone: [phone]  Fax:     [facility]st[facility]  Gynceology/OB  [address] [place] [address]  Phone: [phone]  Fax:

## 2025-01-29 DIAGNOSIS — N83.8 OTHER NONINFLAMMATORY DISORDERS OF OVARY, FALLOPIAN TUBE AND BROAD LIGAMENT: ICD-10-CM

## 2025-01-29 LAB
ANION GAP SERPL CALC-SCNC: 12 MMOL/L — SIGNIFICANT CHANGE UP (ref 5–17)
BASOPHILS # BLD AUTO: 0.05 K/UL — SIGNIFICANT CHANGE UP (ref 0–0.2)
BASOPHILS NFR BLD AUTO: 0.7 % — SIGNIFICANT CHANGE UP (ref 0–2)
BUN SERPL-MCNC: 12 MG/DL — SIGNIFICANT CHANGE UP (ref 7–23)
CALCIUM SERPL-MCNC: 9.1 MG/DL — SIGNIFICANT CHANGE UP (ref 8.4–10.5)
CHLORIDE SERPL-SCNC: 105 MMOL/L — SIGNIFICANT CHANGE UP (ref 96–108)
CO2 SERPL-SCNC: 25 MMOL/L — SIGNIFICANT CHANGE UP (ref 22–31)
CREAT SERPL-MCNC: 0.64 MG/DL — SIGNIFICANT CHANGE UP (ref 0.5–1.3)
EGFR: 95 ML/MIN/1.73M2 — SIGNIFICANT CHANGE UP
EOSINOPHIL # BLD AUTO: 0.28 K/UL — SIGNIFICANT CHANGE UP (ref 0–0.5)
EOSINOPHIL NFR BLD AUTO: 3.8 % — SIGNIFICANT CHANGE UP (ref 0–6)
GLUCOSE SERPL-MCNC: 86 MG/DL — SIGNIFICANT CHANGE UP (ref 70–99)
HCT VFR BLD CALC: 33 % — LOW (ref 34.5–45)
HGB BLD-MCNC: 10.2 G/DL — LOW (ref 11.5–15.5)
IMM GRANULOCYTES NFR BLD AUTO: 0.5 % — SIGNIFICANT CHANGE UP (ref 0–0.9)
LYMPHOCYTES # BLD AUTO: 2.53 K/UL — SIGNIFICANT CHANGE UP (ref 1–3.3)
LYMPHOCYTES # BLD AUTO: 34.7 % — SIGNIFICANT CHANGE UP (ref 13–44)
MCHC RBC-ENTMCNC: 24.9 PG — LOW (ref 27–34)
MCHC RBC-ENTMCNC: 30.9 G/DL — LOW (ref 32–36)
MCV RBC AUTO: 80.7 FL — SIGNIFICANT CHANGE UP (ref 80–100)
MONOCYTES # BLD AUTO: 0.48 K/UL — SIGNIFICANT CHANGE UP (ref 0–0.9)
MONOCYTES NFR BLD AUTO: 6.6 % — SIGNIFICANT CHANGE UP (ref 2–14)
NEUTROPHILS # BLD AUTO: 3.92 K/UL — SIGNIFICANT CHANGE UP (ref 1.8–7.4)
NEUTROPHILS NFR BLD AUTO: 53.7 % — SIGNIFICANT CHANGE UP (ref 43–77)
NRBC # BLD: 0 /100 WBCS — SIGNIFICANT CHANGE UP (ref 0–0)
NRBC BLD-RTO: 0 /100 WBCS — SIGNIFICANT CHANGE UP (ref 0–0)
PLATELET # BLD AUTO: 345 K/UL — SIGNIFICANT CHANGE UP (ref 150–400)
POTASSIUM SERPL-MCNC: 3.9 MMOL/L — SIGNIFICANT CHANGE UP (ref 3.5–5.3)
POTASSIUM SERPL-SCNC: 3.9 MMOL/L — SIGNIFICANT CHANGE UP (ref 3.5–5.3)
RBC # BLD: 4.09 M/UL — SIGNIFICANT CHANGE UP (ref 3.8–5.2)
RBC # FLD: 14.9 % — HIGH (ref 10.3–14.5)
SODIUM SERPL-SCNC: 142 MMOL/L — SIGNIFICANT CHANGE UP (ref 135–145)
WBC # BLD: 7.3 K/UL — SIGNIFICANT CHANGE UP (ref 3.8–10.5)
WBC # FLD AUTO: 7.3 K/UL — SIGNIFICANT CHANGE UP (ref 3.8–10.5)

## 2025-01-29 PROCEDURE — 99233 SBSQ HOSP IP/OBS HIGH 50: CPT

## 2025-01-29 PROCEDURE — 99222 1ST HOSP IP/OBS MODERATE 55: CPT | Mod: GC

## 2025-01-29 RX ORDER — DIPHENHYDRAMINE HCL 25 MG
25 CAPSULE ORAL ONCE
Refills: 0 | Status: COMPLETED | OUTPATIENT
Start: 2025-01-29 | End: 2025-01-29

## 2025-01-29 RX ORDER — ACETAMINOPHEN 160 MG/5ML
1000 SUSPENSION ORAL ONCE
Refills: 0 | Status: COMPLETED | OUTPATIENT
Start: 2025-01-29 | End: 2025-01-29

## 2025-01-29 RX ORDER — PROMETHAZINE HCL 25 MG
1 SUPPOSITORY, RECTAL RECTAL
Refills: 0 | DISCHARGE

## 2025-01-29 RX ORDER — METOCLOPRAMIDE 10 MG/1
10 TABLET ORAL ONCE
Refills: 0 | Status: COMPLETED | OUTPATIENT
Start: 2025-01-29 | End: 2025-01-29

## 2025-01-29 RX ADMIN — Medication 4 MILLILITER(S): at 05:29

## 2025-01-29 RX ADMIN — ACETAMINOPHEN 400 MILLIGRAM(S): 160 SUSPENSION ORAL at 11:12

## 2025-01-29 RX ADMIN — Medication 4 MILLILITER(S): at 17:38

## 2025-01-29 RX ADMIN — GABAPENTIN 100 MILLIGRAM(S): 800 TABLET ORAL at 14:27

## 2025-01-29 RX ADMIN — CEFTRIAXONE 100 MILLIGRAM(S): 250 INJECTION, POWDER, FOR SOLUTION INTRAMUSCULAR; INTRAVENOUS at 11:13

## 2025-01-29 RX ADMIN — ACETAMINOPHEN 975 MILLIGRAM(S): 160 SUSPENSION ORAL at 05:29

## 2025-01-29 RX ADMIN — AZITHROMYCIN DIHYDRATE 500 MILLIGRAM(S): 500 TABLET, FILM COATED ORAL at 11:12

## 2025-01-29 RX ADMIN — GABAPENTIN 100 MILLIGRAM(S): 800 TABLET ORAL at 21:25

## 2025-01-29 RX ADMIN — Medication 750 MILLIGRAM(S): at 05:28

## 2025-01-29 RX ADMIN — Medication 750 MILLIGRAM(S): at 21:23

## 2025-01-29 RX ADMIN — Medication 25 MILLIGRAM(S): at 11:12

## 2025-01-29 RX ADMIN — Medication 750 MILLIGRAM(S): at 14:27

## 2025-01-29 RX ADMIN — GABAPENTIN 100 MILLIGRAM(S): 800 TABLET ORAL at 05:29

## 2025-01-29 RX ADMIN — METOCLOPRAMIDE 10 MILLIGRAM(S): 10 TABLET ORAL at 11:12

## 2025-01-29 RX ADMIN — ACETAMINOPHEN 975 MILLIGRAM(S): 160 SUSPENSION ORAL at 21:24

## 2025-01-29 NOTE — CONSULT NOTE ADULT - SUBJECTIVE AND OBJECTIVE BOX
IAN RODRIGUEZ  69y  Female [id number]    HPI: 69F with no significant PMH coming in to ED for 2 weeks of subjective fevers and headache associated with lower back pain currently being treated for tension headache and pneumonia. At the ED her vitals were significant for elevated temperature to 100.2, HR in 70s-90s, stable BP and saturating well on RA. WBC  was elevated to 15.1 w/ neutrophil predominance. Brudzinski sign was negative, and patient was AOx4 but given stiffness and pain of neck and concerns for meningitis, the ED attempted an LP but was unsuccessful. The patient was admitted to medicine.     GYN consulted for incidental finding of right ovarian mass on CT A/P which was obtained for her back pain to r/o spine fracture. This is a new finding compared to 2006 CT A/P. Patient then underwent a TVUS which showed a complex mass within the right adnexa with solid and cystic components and septations in the cystic mass concerning for malignancy. An MRI was then performed to better assess the ovarian mass. 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 Left adnexa unremarkable. Patient denies abdominal pain, bloating, unexpected weight loss. She has a history of a KEVIN for fibroids over 25 years ago and denies postmenopausal bleeding.    Patient sees Dr. Daysi Barroso in [place] and last had a physical 2 weeks ago. Denies being told of any concerns or abnormalities. Denies h/o abnormal pap smears. Reports difficulty (discomfort) with internal pelvic exams.      Name of GYN Physician: Dr. Barroso    POB: NSVDx1  Pgyn: s/p KEVIN for fibroids  PMH: Migraines, h/o bowel obstruction  PSH: KEVIN  Meds: Promethazine, MVI  All: NKDA  SH: Denies smoking use, drug use, alcohol use.     Hospital Meds:   MEDICATIONS  (STANDING):  acetaminophen     Tablet .. 975 milliGRAM(s) Oral every 8 hours  cefTRIAXone   IVPB 1000 milliGRAM(s) IV Intermittent every 24 hours  cefTRIAXone   IVPB      enoxaparin Injectable 40 milliGRAM(s) SubCutaneous every 24 hours  gabapentin 100 milliGRAM(s) Oral every 8 hours  influenza  Vaccine (HIGH DOSE) 0.5 milliLiter(s) IntraMuscular once  methocarbamol 750 milliGRAM(s) Oral three times a day  sodium chloride 3%  Inhalation 4 milliLiter(s) Inhalation every 12 hours    MEDICATIONS  (PRN):      FAMILY HISTORY:  No pertinent family history in first degree relatives    FH: hypertension (Father, Mother)    FH: Alzheimers disease (Mother)        Vital Signs Last 24 Hrs  T(C): 36.6 (29 Jan 2025 13:10), Max: 36.8 (28 Jan 2025 16:23)  T(F): 97.8 (29 Jan 2025 13:10), Max: 98.3 (29 Jan 2025 08:27)  HR: 65 (29 Jan 2025 13:10) (65 - 70)  BP: 136/81 (29 Jan 2025 13:10) (112/65 - 136/81)  BP(mean): --  RR: 18 (29 Jan 2025 13:10) (18 - 18)  SpO2: 96% (29 Jan 2025 13:10) (95% - 97%)    Parameters below as of 29 Jan 2025 13:10  Patient On (Oxygen Delivery Method): room air        Physical Exam:   General: sitting comfortably in bed, NAD   Abd: Soft, non-tender, non-distended.  Ext: non-tender b/l, no edema     LABS:                          10.2   7.30  )-----------( 345      ( 29 Jan 2025 06:55 )             33.0     01-29    142  |  105  |  12  ----------------------------<  86  3.9   |  25  |  0.64    Ca    9.1      29 Jan 2025 06:53  Phos  4.1     01-28  Mg     2.2     01-28      I&O's Detail    28 Jan 2025 07:01  -  29 Jan 2025 07:00  --------------------------------------------------------  IN:    Oral Fluid: 1740 mL  Total IN: 1740 mL    OUT:    Stool (mL): 0 mL    Voided (mL): 2 mL  Total OUT: 2 mL    Total NET: 1738 mL      29 Jan 2025 07:01  -  29 Jan 2025 14:27  --------------------------------------------------------  IN:    Oral Fluid: 480 mL  Total IN: 480 mL    OUT:  Total OUT: 0 mL    Total NET: 480 mL          Urinalysis Basic - ( 29 Jan 2025 06:53 )    Color: x / Appearance: x / SG: x / pH: x  Gluc: 86 mg/dL / Ketone: x  / Bili: x / Urobili: x   Blood: x / Protein: x / Nitrite: x   Leuk Esterase: x / RBC: x / WBC x   Sq Epi: x / Non Sq Epi: x / Bacteria: x        RADIOLOGY & ADDITIONAL STUDIES:    < from: MR Pelvis w/wo IV Cont (01.28.25 @ 22:03) >  ACC: [id number] EXAM:  MR PELVIS WAW IC   ORDERED BY: MARILYNN MERRILL     PROCEDURE DATE:  01/28/2025          INTERPRETATION:  CLINICAL INFORMATION: Right ovarian mass.    COMPARISON: Pelvic sonogram 1/28/2025. CT abdomen pelvis 1/26/2025.    CONTRAST/COMPLICATIONS:  IV Contrast: Gadavist  6cc cc administered   1.5cc cc discarded  Oral Contrast: NONE  .    PROCEDURE:  MRI of the pelvis was performed.  -    FINDINGS:  UTERUS: Supracervical hysterectomy.    RIGHT OVARY: Not right ovary measuring 5.5 x3.0 x 3.7 cm. There are two   simple cysts, each measuring 3.8 cm and 2.2 cm. In addition, there is a   T2 hypointense lesion measuring 2.9 x 2.1 x 2.5 cm without diffusion   restriction and demonstrating minimal enhancement.  LEFT OVARY: Age-appropriate atrophy.  ADNEXA: Within normal limits.    BLADDER: Within normal limits.    LYMPH NODES: No pelvic lymphadenopathy.    VISUALIZED PORTIONS:    ABDOMINAL ORGANS: Within normal limits.  BOWEL: Within normal limits.  PERITONEUM: No ascites.  VESSELS: Within normal limits.  ABDOMINAL WALL: Within normal limits.  BONES: Within normal limits.    IMPRESSION:  A right ovarian T2 hypointense lesion measuring 2.9 x 2.1 x 2.5 cm   without diffusion restriction and demonstrating minimal enhancement.   Differentials include ovarian fibroma/fibrothecoma. Less likely broad   ligament fibroid.        --- End of Report ---    < end of copied text >    < from: US Transvaginal (01.28.25 @ 12:03) >    ACC: [id number] EXAM:  US PELVIC COMPLETE   ORDERED BY: REGINALDO STOVALL     PROCEDURE DATE:  01/28/2025          INTERPRETATION:  CLINICAL INFORMATION: 69 year old female with right   adnexal mass seen on CT. The patient provides a history of  remote prior   hysterectomy for fibroids.    LMP: Post menopausal    COMPARISON: CT abdomen pelvis 1/26/2025, 6/17/2006.    TECHNIQUE:  Endovaginal and transabdominal pelvic sonogram. Color and Spectral   Doppler was performed.    FINDINGS:  Uterus: Hysterectomy. There appears to be a partially retained cervix,   compatible with a supracervical hysterectomy.  Endometrium: Hysterectomy    Right ovary: A normal right ovary is not visualized. Within the right   adnexa, there is a complex mass which overall measures approximately 5.9   x 3.2 x 4.0 cm. This contains solid and cystic components. The cystic   component measures 4.6 x 3.0 x 4.0 cm is lobulated in contour and   contains septations. There is a surrounding rim of soft tissue which   demonstrates arterial and venous flow with Doppler, possibly reflecting   ovarian parenchyma. There is a solid, hypoechoic component which measures   2.8 x 2.4 x 2.5 cm which is associated with dense posterior acoustic   shadowing. There is minimal internal vascularity demonstrated within this   component with  Doppler.  Left ovary: Not visualized.    Fluid: Trace simple fluid.    IMPRESSION:    1. Complex mass within the right adnexa with solid and cystic components   corresponding to the abnormality described on the most recent prior CT   scan. This was not visualized on the more remote CT scan of 6/17/2006.   The solid component of this mass is hypoechoic and demonstrates dense   posterior acoustic shadowing with minimal internal vascularity suggesting   a lesion with fibrous elements. The complex cystic component contains   internal septations and a rim of soft tissue with arterial and  venous   flow, possibly representing ovarian parenchyma. The constellation of   findings is concerning for ovarian neoplasm in this postmenopausal   patient. For more definitive characterization of this mass, MRI of the   pelvis with contrast is recommended.    Findings were discussed with Marilynn Merrill NP by Hossein Leonard M.D. on   1/28/2025 at 4:20 PM    < end of copied text >

## 2025-01-29 NOTE — CONSULT NOTE ADULT - ATTENDING COMMENTS
70 yo F s/p hysterectomy admitted to medicine for headache and pneumonia, incidentally found to have adnexal mass on work up of back pain. MRI shows 2 simple appearing cysts: 3.8 and 2.2 cm and 2.9cm right ovarian mass that is likely fibroma/fibrothecoma and less likely a broad ligament fibroid. Recommend tumor markers drawn. CA-125 and CEA resulted wnl. Low concern for malignancy. Discussed recommendation for removal of both ovaries including R ovarian cysts as an interval outpatient surgery. Patient counseled that she can follow up with her established GYN or see us in the office. Our office information will be included in her discharge paperwork. All questions and concerns addressed.     Estefania Simmons MD

## 2025-01-29 NOTE — PROGRESS NOTE ADULT - TIME BILLING
Time-based billing (NON-critical care).     The necessity of the time spent during the encounter on this date of service was due to:     Time spent on review of vitals, physical exam, documentation, and discussion of plan of care with patient and patient family.
patient encounter, reviewing tests and imaging, independently obtaining a history, performing a physical examination, discussing the plan with the patient, ordering medications/tests, documenting clinical information, and coordinating care. This excludes any time spent on teaching.
patient encounter, reviewing tests and imaging, independently obtaining a history, performing a physical examination, discussing the plan with the patient, ordering medications/tests, documenting clinical information, and coordinating care. This excludes any time spent on teaching.

## 2025-01-29 NOTE — CONSULT NOTE ADULT - ASSESSMENT
68 yo postmenopausal female h/o fibroids s/p KEVIN p/w HA and subjective fevers a/w PNA and headache work up incidentally found to have 6cm solid and cystic right ovarian mass which appears to have benign features on MRI A/P. 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 Patient denies abdominal pain, vaginal bleeding, or any GYN concerns.    #Right adnexal mass  - Recommend obtaining tumor markers: CEA, , CA 19-9, HE4.  - Recommend f/u with outpatient GYN to discuss surgical resection. Will also offer patient to follow up with [facility] booking clinic if she prefers. Address and phone number below  - No acute GYN intervention    [address] [facility]   [address]  [place], [place] [address] [phone]    d/w Dr. Troy Mora, PGY4

## 2025-01-30 ENCOUNTER — TRANSCRIPTION ENCOUNTER (OUTPATIENT)
Age: 70
End: 2025-01-30

## 2025-01-30 VITALS
RESPIRATION RATE: 18 BRPM | OXYGEN SATURATION: 99 % | SYSTOLIC BLOOD PRESSURE: 140 MMHG | HEART RATE: 75 BPM | TEMPERATURE: 98 F | DIASTOLIC BLOOD PRESSURE: 73 MMHG

## 2025-01-30 LAB
CANCER AG125 SERPL-ACNC: 8 U/ML — SIGNIFICANT CHANGE UP
CANCER AG19-9 SERPL-ACNC: <2 U/ML — SIGNIFICANT CHANGE UP
CEA SERPL-MCNC: 1.7 NG/ML — SIGNIFICANT CHANGE UP (ref 0–3.8)

## 2025-01-30 PROCEDURE — 62270 DX LMBR SPI PNXR: CPT

## 2025-01-30 PROCEDURE — 87389 HIV-1 AG W/HIV-1&-2 AB AG IA: CPT

## 2025-01-30 PROCEDURE — 86301 IMMUNOASSAY TUMOR CA 19-9: CPT

## 2025-01-30 PROCEDURE — 94640 AIRWAY INHALATION TREATMENT: CPT

## 2025-01-30 PROCEDURE — 84145 PROCALCITONIN (PCT): CPT

## 2025-01-30 PROCEDURE — 82330 ASSAY OF CALCIUM: CPT

## 2025-01-30 PROCEDURE — 85025 COMPLETE CBC W/AUTO DIFF WBC: CPT

## 2025-01-30 PROCEDURE — 87086 URINE CULTURE/COLONY COUNT: CPT

## 2025-01-30 PROCEDURE — 84100 ASSAY OF PHOSPHORUS: CPT

## 2025-01-30 PROCEDURE — 82947 ASSAY GLUCOSE BLOOD QUANT: CPT

## 2025-01-30 PROCEDURE — 36415 COLL VENOUS BLD VENIPUNCTURE: CPT

## 2025-01-30 PROCEDURE — 85027 COMPLETE CBC AUTOMATED: CPT

## 2025-01-30 PROCEDURE — 83690 ASSAY OF LIPASE: CPT

## 2025-01-30 PROCEDURE — 84295 ASSAY OF SERUM SODIUM: CPT

## 2025-01-30 PROCEDURE — 70498 CT ANGIOGRAPHY NECK: CPT | Mod: MC

## 2025-01-30 PROCEDURE — 82378 CARCINOEMBRYONIC ANTIGEN: CPT

## 2025-01-30 PROCEDURE — 72125 CT NECK SPINE W/O DYE: CPT | Mod: MC

## 2025-01-30 PROCEDURE — 87637 SARSCOV2&INF A&B&RSV AMP PRB: CPT

## 2025-01-30 PROCEDURE — 71260 CT THORAX DX C+: CPT | Mod: MC

## 2025-01-30 PROCEDURE — 70496 CT ANGIOGRAPHY HEAD: CPT | Mod: MC

## 2025-01-30 PROCEDURE — 84132 ASSAY OF SERUM POTASSIUM: CPT

## 2025-01-30 PROCEDURE — 84484 ASSAY OF TROPONIN QUANT: CPT

## 2025-01-30 PROCEDURE — 76856 US EXAM PELVIC COMPLETE: CPT

## 2025-01-30 PROCEDURE — 83605 ASSAY OF LACTIC ACID: CPT

## 2025-01-30 PROCEDURE — 87529 HSV DNA AMP PROBE: CPT

## 2025-01-30 PROCEDURE — 74177 CT ABD & PELVIS W/CONTRAST: CPT | Mod: MC

## 2025-01-30 PROCEDURE — 99285 EMERGENCY DEPT VISIT HI MDM: CPT

## 2025-01-30 PROCEDURE — 87798 DETECT AGENT NOS DNA AMP: CPT

## 2025-01-30 PROCEDURE — 85610 PROTHROMBIN TIME: CPT

## 2025-01-30 PROCEDURE — 71045 X-RAY EXAM CHEST 1 VIEW: CPT

## 2025-01-30 PROCEDURE — 99239 HOSP IP/OBS DSCHRG MGMT >30: CPT

## 2025-01-30 PROCEDURE — 76830 TRANSVAGINAL US NON-OB: CPT

## 2025-01-30 PROCEDURE — 83735 ASSAY OF MAGNESIUM: CPT

## 2025-01-30 PROCEDURE — 93005 ELECTROCARDIOGRAM TRACING: CPT

## 2025-01-30 PROCEDURE — 87040 BLOOD CULTURE FOR BACTERIA: CPT

## 2025-01-30 PROCEDURE — 80053 COMPREHEN METABOLIC PANEL: CPT

## 2025-01-30 PROCEDURE — 85730 THROMBOPLASTIN TIME PARTIAL: CPT

## 2025-01-30 PROCEDURE — A9585: CPT

## 2025-01-30 PROCEDURE — 80074 ACUTE HEPATITIS PANEL: CPT

## 2025-01-30 PROCEDURE — 96375 TX/PRO/DX INJ NEW DRUG ADDON: CPT

## 2025-01-30 PROCEDURE — 87640 STAPH A DNA AMP PROBE: CPT

## 2025-01-30 PROCEDURE — 86304 IMMUNOASSAY TUMOR CA 125: CPT

## 2025-01-30 PROCEDURE — 70498 CT ANGIOGRAPHY NECK: CPT | Mod: 26

## 2025-01-30 PROCEDURE — 71250 CT THORAX DX C-: CPT | Mod: MC

## 2025-01-30 PROCEDURE — 81001 URINALYSIS AUTO W/SCOPE: CPT

## 2025-01-30 PROCEDURE — 87641 MR-STAPH DNA AMP PROBE: CPT

## 2025-01-30 PROCEDURE — 85014 HEMATOCRIT: CPT

## 2025-01-30 PROCEDURE — 85018 HEMOGLOBIN: CPT

## 2025-01-30 PROCEDURE — 82435 ASSAY OF BLOOD CHLORIDE: CPT

## 2025-01-30 PROCEDURE — 87799 DETECT AGENT NOS DNA QUANT: CPT

## 2025-01-30 PROCEDURE — 96374 THER/PROPH/DIAG INJ IV PUSH: CPT | Mod: XU

## 2025-01-30 PROCEDURE — 80048 BASIC METABOLIC PNL TOTAL CA: CPT

## 2025-01-30 PROCEDURE — 72197 MRI PELVIS W/O & W/DYE: CPT | Mod: MC

## 2025-01-30 PROCEDURE — 70450 CT HEAD/BRAIN W/O DYE: CPT | Mod: MC

## 2025-01-30 PROCEDURE — 86305 HUMAN EPIDIDYMIS PROTEIN 4: CPT

## 2025-01-30 PROCEDURE — 82803 BLOOD GASES ANY COMBINATION: CPT

## 2025-01-30 PROCEDURE — 70496 CT ANGIOGRAPHY HEAD: CPT | Mod: 26

## 2025-01-30 PROCEDURE — 97161 PT EVAL LOW COMPLEX 20 MIN: CPT

## 2025-01-30 PROCEDURE — 0225U NFCT DS DNA&RNA 21 SARSCOV2: CPT

## 2025-01-30 RX ORDER — ASCORBIC ACID 500 MG/ML
2 VIAL (ML) INJECTION
Refills: 0 | DISCHARGE

## 2025-01-30 RX ORDER — PROMETHAZINE HCL 25 MG
1 SUPPOSITORY, RECTAL RECTAL
Refills: 0 | DISCHARGE

## 2025-01-30 RX ORDER — ACETAMINOPHEN 160 MG/5ML
3 SUSPENSION ORAL
Qty: 0 | Refills: 0 | DISCHARGE
Start: 2025-01-30

## 2025-01-30 RX ORDER — OMEGA-3 FATTY ACIDS CAP 1000 MG 1000 MG
1 CAP ORAL
Refills: 0 | DISCHARGE

## 2025-01-30 RX ORDER — METHOCARBAMOL 500 MG
1 TABLET ORAL
Qty: 30 | Refills: 0
Start: 2025-01-30 | End: 2025-02-08

## 2025-01-30 NOTE — DISCHARGE NOTE NURSING/CASE MANAGEMENT/SOCIAL WORK - PATIENT PORTAL LINK FT
You can access the FollowMyHealth Patient Portal offered by [facility] by registering at the following website: [email or web address]. By joining [facility]’s FollowMyHealth portal, you will also be able to view your health information using other applications (apps) compatible with our system.

## 2025-01-30 NOTE — PROGRESS NOTE ADULT - PROBLEM SELECTOR PLAN 1
Initially tachycardic to 90s and WBC count of 15.1 - neutrophil predominant; meets criteria for sepsis but clinically stable and unclear source on infection. Had a recent URI w/ cough; full RVP negative. CT chest negative, but lung exam and clinical story consistent with pneumonia. Sepsis markers improving with antibiotics. Doubt meninigitis at this time given patient is at baseline mental status, no nuchal rigidity, no photophobia or visual disturbances, improving headaches, and negative Brudzinski sign.   - Ceftriaxone 1 gram daily  - Azithromycin 500 mg daily    - Monitor WBC count, fever curve   - F/u BCx X2
Initially tachycardic to 90s and WBC count of 15.1 - neutrophil predominant; meets criteria for sepsis but clinically stable and unclear source on infection. Had a recent URI w/ cough; full RVP negative. CT chest negative, but lung exam and clinical story consistent with pneumonia. Sepsis markers improving with antibiotics. Doubt meninigitis at this time given patient is at baseline mental status, no nuchal rigidity, no photophobia or visual disturbances, improving headaches, and negative Brudzinski sign.   - Ceftriaxone 1 gram daily  - Azithromycin 500 mg daily    - Monitor WBC count, fever curve   - F/u BCx X2  - F/u EBV PCR, CMV PCR, Hepatitis Panel, HIV, MRSA swab, Procalcitonin
Patient presented with occipital region headaches, as well as neck pain and lumbar pain back. Suspect secondary to either (a) inflammatory disease from acute illness or (b) degenerative disc disease.   - Tylenol 975 mg q8h   - Toradol 15 mg q6h for 24 hours, start on 1/27/25   - Reglan 10 mg IV x 1, monitor for improvement   - Gabapentin 100 mg q8h   - Methocarbamol 750 mg PO three times a day  - s/p HA cocktail: benadryl, reglan and tylenol  - CTA of head and neck unrevealing  - neuro fu as outpatient
Patient presented with occipital region headaches, as well as neck pain and lumbar pain back. Suspect secondary to either (a) inflammatory disease from acute illness or (b) degenerative disc disease.   - Tylenol 975 mg q8h   - Toradol 15 mg q6h for 24 hours, start on 1/27/25   - Reglan 10 mg IV x 1, monitor for improvement   - Gabapentin 100 mg q8h   - Methocarbamol 750 mg PO three times a day  - will try HA cocktail: benadryl, reglan and tylenol

## 2025-01-30 NOTE — PROGRESS NOTE ADULT - PROBLEM SELECTOR PLAN 4
CT A&P showing Bilobed R adnexal mass.   - F/u w/ a pelvic ultrasound to further characterize lesion  - Outpatient gynecology follow up for monitoring
Initially tachycardic to 90s and WBC count of 15.1 - neutrophil predominant; meets criteria for sepsis but clinically stable and unclear source on infection. Had a recent URI w/ cough; full RVP negative. CT chest negative, but lung exam and clinical story consistent with pneumonia. Sepsis markers improving with antibiotics. Doubt meninigitis at this time given patient is at baseline mental status, no nuchal rigidity, no photophobia or visual disturbances, improving headaches, and negative Brudzinski sign.   - Ceftriaxone 1 gram daily  - Azithromycin 500 mg daily    - Monitor WBC count, fever curve   - F/u BCx X2
Initially tachycardic to 90s and WBC count of 15.1 - neutrophil predominant; meets criteria for sepsis but clinically stable and unclear source on infection. Had a recent URI w/ cough; full RVP negative. CT chest negative, but lung exam and clinical story consistent with pneumonia. Sepsis markers improving with antibiotics. Doubt meninigitis at this time given patient is at baseline mental status, no nuchal rigidity, no photophobia or visual disturbances, improving headaches, and negative Brudzinski sign.   - Ceftriaxone 1 gram daily  - Azithromycin 500 mg daily    - Monitor WBC count, fever curve   - F/u BCx X2
CT A&P showing Bilobed R adnexal mass.   - F/u w/ a pelvic ultrasound to further characterize lesion  - Outpatient gynecology follow up for monitoring

## 2025-01-30 NOTE — PROGRESS NOTE ADULT - ASSESSMENT
69 year old female presenting with headache, found to have sepsis likely secondary to pneumonia. 

## 2025-01-30 NOTE — PROGRESS NOTE ADULT - PROBLEM SELECTOR PLAN 3
CT showing LLL consolidation and R upper and middle mediastinal cystic mass. Likely incidental.  - Outpatient thoracic surgery follow up for monitoring

## 2025-01-30 NOTE — DISCHARGE NOTE NURSING/CASE MANAGEMENT/SOCIAL WORK - FINANCIAL ASSISTANCE
[facility] provides services at a reduced cost to those who are determined to be eligible through [facility]’s financial assistance program. Information regarding [facility]’s financial assistance program can be found by going to [email or web address] or by calling [phone].

## 2025-01-30 NOTE — PROGRESS NOTE ADULT - PROBLEM SELECTOR PLAN 6
- Nasal saline q12h
- Nasal saline q12h
Transaminases mildly elevated w/ , AST/ALT 48/51 at admission. Likely indicator of severe sepsis given pneumonia above. Improved with antibiotics.   - No further work up or monitoring
Transaminases mildly elevated w/ , AST/ALT 48/51 at admission. Likely indicator of severe sepsis given pneumonia above. Improved with antibiotics.   - No further work up or monitoring

## 2025-01-30 NOTE — PROGRESS NOTE ADULT - PROBLEM SELECTOR PLAN 2
MRI with A right ovarian T2 hypointense lesion measuring 2.9 x 2.1 x 2.5 cm without diffusion restriction and demonstrating minimal enhancement. Differentials include ovarian fibroma/fibrothecoma.  - GYN consult
Patient presented with occipital region headaches, as well as neck pain and lumbar pain back. Suspect secondary to either (a) inflammatory disease from acute illness or (b) degenerative disc disease.   - Tylenol 975 mg q8h   - Toradol 15 mg q6h for 24 hours, start on 1/27/25   - Reglan 10 mg IV x 1, monitor for improvement   - Gabapentin 100 mg q8h   - Methocarbamol 750 mg PO three times a day
MRI with A right ovarian T2 hypointense lesion measuring 2.9 x 2.1 x 2.5 cm without diffusion restriction and demonstrating minimal enhancement. Differentials include ovarian fibroma/fibrothecoma.  - GYN consult appreciated  - CA markers sent  - to fu as outpatient. Pt understands
Patient presented with occipital region headaches, as well as neck pain and lumbar pain back. Suspect secondary to either (a) inflammatory disease from acute illness or (b) degenerative disc disease.   - Tylenol 975 mg q8h   - Toradol 15 mg q6h for 24 hours, start on 1/27/25   - Reglan 10 mg IV x 1, monitor for improvement   - Gabapentin 100 mg q8h   - Methocarbamol 750 mg PO three times a day

## 2025-01-30 NOTE — PROGRESS NOTE ADULT - PROBLEM SELECTOR PLAN 8
General  - DVT prophylaxis: Lovenox 40 mg daily   - Diet: regular   - Medication reconciliation: complete   - Code: FULL
General  - DVT prophylaxis: Lovenox 40 mg daily   - Diet: regular   - Medication reconciliation: complete   - Code: FULL

## 2025-01-30 NOTE — PROGRESS NOTE ADULT - REASON FOR ADMISSION
January 27, 2025      [facility]  [address] [place] [id number]  Phone: [phone]  Fax: [phone]       Patient: Thomas Bailon   YOB: 1954  Date of Visit: 01/27/2025    To Whom It May Concern:    Maribel Bailon  was at [facility] on 01/27/2025. The patient may return to work/school on 01/03/25 with no restrictions. If you have any questions or concerns, or if I can be of further assistance, please do not hesitate to contact me.    Sincerely,  MD Kirill Gregory, CMA     
Headache

## 2025-01-30 NOTE — PROGRESS NOTE ADULT - PROBLEM SELECTOR PLAN 5
Transaminases mildly elevated w/ , AST/ALT 48/51 at admission. Likely indicator of severe sepsis given pneumonia above. Improved with antibiotics.   - No further work up or monitoring
Transaminases mildly elevated w/ , AST/ALT 48/51 at admission. Likely indicator of severe sepsis given pneumonia above. Improved with antibiotics.   - No further work up or monitoring
CT A&P showing Bilobed R adnexal mass.   - F/u w/ a pelvic ultrasound to further characterize lesion  - Outpatient gynecology follow up for monitoring
CT A&P showing Bilobed R adnexal mass.   - F/u w/ a pelvic ultrasound to further characterize lesion  - Outpatient gynecology follow up for monitoring

## 2025-01-30 NOTE — PROGRESS NOTE ADULT - PROBLEM SELECTOR PLAN 7
General  - DVT prophylaxis: Lovenox 40 mg daily   - Diet: regular   - Medication reconciliation: complete   - Code: FULL     Disposition  - Needed before discharge: headache improve, sepsis markers improve on ceftriaxone   - Anticipated discharge date: 1/28/24  - Discharge to: home   - Appointments after discharge: PCP, gynecology, CT surgery     Plan discussed with patient in detail. Patient agrees with plan. All questions answered. Patient's son Gio ([phone]) updated at 11:20 am.
- Nasal saline q12h
- Nasal saline q12h
General  - DVT prophylaxis: Lovenox 40 mg daily   - Diet: regular   - Medication reconciliation: complete   - Code: FULL
No
increased seizure activity

## 2025-01-30 NOTE — PROGRESS NOTE ADULT - NSPROGADDITIONALINFOA_GEN_ALL_CORE
above plans discussed with medicine ACP Gage
above plans discussed with medicine ACP Michelle  stable for discharge  44 minutes spent
above plans discussed with medicine ACP Marilynn

## 2025-01-30 NOTE — PROGRESS NOTE ADULT - SUBJECTIVE AND OBJECTIVE BOX
Catie Gagnon MD  Division of Hospital Medicine  Please contact via MS Teams (prefer message first)  Office: [phone]    Patient is a 69y old  Female who presents with a chief complaint of Headache (28 Jan 2025 11:28)      SUBJECTIVE / OVERNIGHT EVENTS:  no acute events overnight, vss, afebrile  pt continues to endorse tension/tightness in L shoulder travelling up to the head  meds only helping her so much    ROS:  14 point ROS negative in detail except stated as above    MEDICATIONS  (STANDING):  acetaminophen     Tablet .. 975 milliGRAM(s) Oral every 8 hours  acetaminophen   IVPB .. 1000 milliGRAM(s) IV Intermittent once  azithromycin   Tablet 500 milliGRAM(s) Oral every 24 hours  cefTRIAXone   IVPB 1000 milliGRAM(s) IV Intermittent every 24 hours  cefTRIAXone   IVPB      diphenhydrAMINE Injectable 25 milliGRAM(s) IV Push once  enoxaparin Injectable 40 milliGRAM(s) SubCutaneous every 24 hours  gabapentin 100 milliGRAM(s) Oral every 8 hours  influenza  Vaccine (HIGH DOSE) 0.5 milliLiter(s) IntraMuscular once  methocarbamol 750 milliGRAM(s) Oral three times a day  metoclopramide Injectable 10 milliGRAM(s) IV Push once  sodium chloride 3%  Inhalation 4 milliLiter(s) Inhalation every 12 hours    MEDICATIONS  (PRN):      CAPILLARY BLOOD GLUCOSE        I&O's Summary    28 Jan 2025 07:01  -  29 Jan 2025 07:00  --------------------------------------------------------  IN: 1740 mL / OUT: 2 mL / NET: 1738 mL        PHYSICAL EXAM:  Vital Signs Last 24 Hrs  T(C): 36.8 (29 Jan 2025 08:27), Max: 36.8 (28 Jan 2025 16:23)  T(F): 98.3 (29 Jan 2025 08:27), Max: 98.3 (29 Jan 2025 08:27)  HR: 70 (29 Jan 2025 08:27) (68 - 70)  BP: 131/78 (29 Jan 2025 08:27) (112/65 - 134/80)  BP(mean): --  RR: 18 (29 Jan 2025 08:27) (18 - 18)  SpO2: 95% (29 Jan 2025 08:27) (95% - 97%)    Parameters below as of 29 Jan 2025 08:27  Patient On (Oxygen Delivery Method): room air      GENERAL: NAD, well-developed  HEAD:  Atraumatic, Normocephalic  EYES: EOMI, PERRLA, conjunctiva and sclera clear  NECK: Supple, No JVD  CHEST/LUNG: Clear to auscultation bilaterally; No wheeze  HEART: Regular rate and rhythm; No murmurs, rubs, or gallops  ABDOMEN: Soft, Nontender, Nondistended; Bowel sounds present  EXTREMITIES:  2+ Peripheral Pulses, No clubbing, cyanosis, or edema  NEUROLOGY: AAOx3; non-focal  SKIN: No rashes or lesions    LABS:                        10.2   7.30  )-----------( 345      ( 29 Jan 2025 06:55 )             33.0     01-29    142  |  105  |  12  ----------------------------<  86  3.9   |  25  |  0.64    Ca    9.1      29 Jan 2025 06:53  Phos  4.1     01-28  Mg     2.2     01-28            Urinalysis Basic - ( 29 Jan 2025 06:53 )    Color: x / Appearance: x / SG: x / pH: x  Gluc: 86 mg/dL / Ketone: x  / Bili: x / Urobili: x   Blood: x / Protein: x / Nitrite: x   Leuk Esterase: x / RBC: x / WBC x   Sq Epi: x / Non Sq Epi: x / Bacteria: x        RADIOLOGY & ADDITIONAL TESTS:    Imaging Personally Reviewed:  < from: US Transvaginal (01.28.25 @ 12:03) >  1. Complex mass within the right adnexa with solid and cystic components   corresponding to the abnormality described on the most recent prior CT   scan. This was not visualized on the more remote CT scan of 6/17/2006.   The solid component of this mass is hypoechoic and demonstrates dense   posterior acoustic shadowing with minimal internal vascularity suggesting   a lesion with fibrous elements. The complex cystic component contains   internal septations and a rim of soft tissue with arterial and  venous   flow, possibly representing ovarian parenchyma. The constellation of   findings is concerning for ovarian neoplasm in this postmenopausal   patient. For more definitive characterization of this mass, MRI of the   pelvis with contrast is recommended.    < end of copied text >  < from: MR Pelvis w/wo IV Cont (01.28.25 @ 22:03) >  A right ovarian T2 hypointense lesion measuring 2.9 x 2.1 x 2.5 cm   without diffusion restriction and demonstrating minimal enhancement.   Differentials include ovarian fibroma/fibrothecoma. Less likely broad   ligament fibroid.    < end of copied text >      Consultant(s) Notes Reviewed:      Care Discussed with Consultants/Other Providers:  
Catie Gagnon MD  Division of Hospital Medicine  Please contact via MS Teams (prefer message first)  Office: [phone]    Patient is a 69y old  Female who presents with a chief complaint of Headache (27 Jan 2025 11:03)      SUBJECTIVE / OVERNIGHT EVENTS:  no acute events overnight, vss, afebrile  pt reports that her headache is better  has been able to ambulate without difficulty    ROS:  14 point ROS negative in detail except stated as above    MEDICATIONS  (STANDING):  acetaminophen     Tablet .. 975 milliGRAM(s) Oral every 8 hours  azithromycin   Tablet 500 milliGRAM(s) Oral every 24 hours  cefTRIAXone   IVPB 1000 milliGRAM(s) IV Intermittent every 24 hours  cefTRIAXone   IVPB      enoxaparin Injectable 40 milliGRAM(s) SubCutaneous every 24 hours  gabapentin 100 milliGRAM(s) Oral every 8 hours  influenza  Vaccine (HIGH DOSE) 0.5 milliLiter(s) IntraMuscular once  methocarbamol 750 milliGRAM(s) Oral three times a day  sodium chloride 3%  Inhalation 4 milliLiter(s) Inhalation every 12 hours    MEDICATIONS  (PRN):      CAPILLARY BLOOD GLUCOSE        I&O's Summary    27 Jan 2025 07:01  -  28 Jan 2025 07:00  --------------------------------------------------------  IN: 660 mL / OUT: 0 mL / NET: 660 mL    28 Jan 2025 07:01  -  28 Jan 2025 11:29  --------------------------------------------------------  IN: 480 mL / OUT: 1 mL / NET: 479 mL        PHYSICAL EXAM:  Vital Signs Last 24 Hrs  T(C): 36.8 (28 Jan 2025 09:21), Max: 37 (28 Jan 2025 04:37)  T(F): 98.3 (28 Jan 2025 09:21), Max: 98.6 (28 Jan 2025 04:37)  HR: 71 (28 Jan 2025 09:21) (68 - 74)  BP: 129/78 (28 Jan 2025 09:21) (117/76 - 129/78)  BP(mean): --  RR: 18 (28 Jan 2025 09:21) (15 - 18)  SpO2: 97% (28 Jan 2025 09:21) (95% - 99%)    Parameters below as of 28 Jan 2025 09:21  Patient On (Oxygen Delivery Method): room air      GENERAL: NAD, well-developed  HEAD:  Atraumatic, Normocephalic  EYES: EOMI, PERRLA, conjunctiva and sclera clear  NECK: Supple, No JVD  CHEST/LUNG: Clear to auscultation bilaterally; No wheeze  HEART: Regular rate and rhythm; No murmurs, rubs, or gallops  ABDOMEN: Soft, Nontender, Nondistended; Bowel sounds present  EXTREMITIES:  2+ Peripheral Pulses, No clubbing, cyanosis, or edema  NEUROLOGY: AAOx3; non-focal  SKIN: No rashes or lesions    LABS:                        9.6    9.35  )-----------( 292      ( 28 Jan 2025 07:21 )             30.3     01-28    142  |  104  |  17  ----------------------------<  76  3.4[L]   |  25  |  0.63    Ca    8.8      28 Jan 2025 07:13  Phos  4.1     01-28  Mg     2.2     01-28    TPro  7.2  /  Alb  3.3  /  TBili  0.3  /  DBili  x   /  AST  23  /  ALT  33  /  AlkPhos  115  01-27    PT/INR - ( 27 Jan 2025 05:51 )   PT: 14.6 sec;   INR: 1.28 ratio         PTT - ( 27 Jan 2025 05:51 )  PTT:31.7 sec      Urinalysis Basic - ( 28 Jan 2025 07:13 )    Color: x / Appearance: x / SG: x / pH: x  Gluc: 76 mg/dL / Ketone: x  / Bili: x / Urobili: x   Blood: x / Protein: x / Nitrite: x   Leuk Esterase: x / RBC: x / WBC x   Sq Epi: x / Non Sq Epi: x / Bacteria: x        RADIOLOGY & ADDITIONAL TESTS:    Imaging Personally Reviewed:  < from: CT Chest w/ IV Cont (01.27.25 @ 03:12) >  Hypodense lesion in the mediastinum measuring 3.6 cm likely represents a   mediastinal cyst.    Consider CT chest or MRI chest follow-up in 6 months to reassess   stability.    < end of copied text >  < from: CT Abdomen and Pelvis w/ IV Cont (01.26.25 @ 17:10) >  Subsegmental consolidative opacities in the left lower lobe favored to   represent atelectasis over infection, however superinfection in the   collapsed lung cannot be excluded.    Cystic mass in the upper and right middle mediastinum is not well   evaluated on this noncontrast study. Differential diagnosis is broad   including thymic cyst and thymic cystic mass,  pericardial cyst among   other etiology. This could be better evaluated with post contrast imaging.    No acute findings in the abdomen or pelvis.    Bilobed right adnexal mass. Recommend further evaluation with pelvic   ultrasound.    Note that the appendix is not well-visualized however there is no   pericecal inflammation.    < end of copied text >      Consultant(s) Notes Reviewed:      Care Discussed with Consultants/Other Providers:  
Catie Gagnon MD  Division of Hospital Medicine  Please contact via MS Teams (prefer message first)  Office: [phone]    Patient is a 69y old  Female who presents with a chief complaint of Headache (29 Jan 2025 14:26)      SUBJECTIVE / OVERNIGHT EVENTS:  no acute events overnight, vss, afebrile  pt feels okay  noise outside makes her HA worse    ROS:  14 point ROS negative in detail except stated as above    MEDICATIONS  (STANDING):  acetaminophen     Tablet .. 975 milliGRAM(s) Oral every 8 hours  cefTRIAXone   IVPB 1000 milliGRAM(s) IV Intermittent every 24 hours  cefTRIAXone   IVPB      enoxaparin Injectable 40 milliGRAM(s) SubCutaneous every 24 hours  gabapentin 100 milliGRAM(s) Oral every 8 hours  influenza  Vaccine (HIGH DOSE) 0.5 milliLiter(s) IntraMuscular once  methocarbamol 750 milliGRAM(s) Oral three times a day  sodium chloride 3%  Inhalation 4 milliLiter(s) Inhalation every 12 hours    MEDICATIONS  (PRN):      CAPILLARY BLOOD GLUCOSE        I&O's Summary    29 Jan 2025 07:01  -  30 Jan 2025 07:00  --------------------------------------------------------  IN: 960 mL / OUT: 0 mL / NET: 960 mL    30 Jan 2025 07:01  -  30 Jan 2025 11:51  --------------------------------------------------------  IN: 480 mL / OUT: 0 mL / NET: 480 mL        PHYSICAL EXAM:  Vital Signs Last 24 Hrs  T(C): 36.6 (30 Jan 2025 11:08), Max: 36.7 (29 Jan 2025 19:41)  T(F): 97.9 (30 Jan 2025 11:08), Max: 98.1 (30 Jan 2025 09:12)  HR: 75 (30 Jan 2025 11:08) (65 - 81)  BP: 140/73 (30 Jan 2025 11:08) (126/74 - 140/73)  BP(mean): --  RR: 18 (30 Jan 2025 11:08) (18 - 18)  SpO2: 99% (30 Jan 2025 11:08) (94% - 99%)    Parameters below as of 30 Jan 2025 11:08  Patient On (Oxygen Delivery Method): room air      GENERAL: NAD, well-developed  HEAD:  Atraumatic, Normocephalic  EYES: EOMI, PERRLA, conjunctiva and sclera clear  NECK: Supple, No JVD  CHEST/LUNG: Clear to auscultation bilaterally; No wheeze  HEART: Regular rate and rhythm; No murmurs, rubs, or gallops  ABDOMEN: Soft, Nontender, Nondistended; Bowel sounds present  EXTREMITIES:  2+ Peripheral Pulses, No clubbing, cyanosis, or edema  NEUROLOGY: AAOx3; non-focal  SKIN: No rashes or lesions    LABS:                        10.2   7.30  )-----------( 345      ( 29 Jan 2025 06:55 )             33.0     01-29    142  |  105  |  12  ----------------------------<  86  3.9   |  25  |  0.64    Ca    9.1      29 Jan 2025 06:53            Urinalysis Basic - ( 29 Jan 2025 06:53 )    Color: x / Appearance: x / SG: x / pH: x  Gluc: 86 mg/dL / Ketone: x  / Bili: x / Urobili: x   Blood: x / Protein: x / Nitrite: x   Leuk Esterase: x / RBC: x / WBC x   Sq Epi: x / Non Sq Epi: x / Bacteria: x        RADIOLOGY & ADDITIONAL TESTS:    Imaging Personally Reviewed:  < from: CT Angio Neck w/ IV Cont (01.30.25 @ 09:45) >  No hydrocephalus, acute intracranial hemorrhage, mass effect, or brain   edema.    CTA brain:  No flow-limiting stenosis or vascular aneurysm. No AVM.    Venous system is fairly well opacified, no evidence for venous sinus or   cortical vein thrombosis.    CTA neck:  No flow-limiting stenosis, evidence for arterial dissection, or vascular   aneurysm.    Similar-appearing nonspecific 3.8 x 2.9 cm (AP by TV) soft tissue lesion   in the upper and right middle mediastinum.    < end of copied text >      Consultant(s) Notes Reviewed:  GYN    Care Discussed with Consultants/Other Providers:  
[facility] Division of Hospital Medicine  Dennis Hunt MD  Available via MS Teams    SUBJECTIVE / OVERNIGHT EVENTS:  Overnight: No acute events.     Patient evaluated at bedside during morning rounds. HPI and hospital course discussed with the patient in detail. Patient reports still having a headache of the back of the neck and back of the head and the lower back. Reports that pain worsens whenever she moves and that she needs to move, specifically her neck, slowly. Reports chronically having lower back pain since her 20s, which waxes and wanes. She also reports having a headache that has lasted for "years" in the past. Endorses having URI symptoms a week ago that initially improved with unknown antibiotic prescribed by her another doctor in the past.     ADDITIONAL REVIEW OF SYSTEMS:  14 point ROS negative unless otherwise stated above.     COORDINATION OF CARE:  Consultant Communication and Details of Discussion (where applicable): none     VITAL SIGNS:  T(C): 37.2 (01-27 @ 07:12), Max: 37.9 (01-26 @ 14:04)   HR: 78   BP: 135/82   RR: 18   SpO2: 99%    PHYSICAL EXAM:  CONSTITUTIONAL: non-toxic appearing, breathing comfortably on room air  EYES: anicteric   HENT: oropharynx clear and moist, normocephalic, no palpable masses in the neck  LYMPH NODES: no cervical lymphadenopathy   RESPIRATORY: normal respiratory effort; inspiratory crackles in the RLL; no wheezes   CARDIOVASCULAR: regular rate and rhythm, normal S1 and S2, no murmur/rub/gallop  ABDOMEN: positive bowel sounds, non-tender, non-distended, no organomegaly   : no Ward catheter in place, no flank tenderness   MUSCULOSKELETAL:  moving all four extremities   EXTREMITIES:  no lower extremity edema  NEUROLOGY: awake, alert, and oriented to self, place, date, and situation; good fund of knowledge   PSYCH: normal affect       I&O's Summary      LABS:                        10.6   10.76 )-----------( 282      ( 27 Jan 2025 05:51 )             33.6     01-27    139  |  102  |  8   ----------------------------<  206[H]  3.9   |  25  |  0.57    Ca    9.2      27 Jan 2025 05:51  Phos  4.6     01-27  Mg     2.2     01-27    TPro  7.2  /  Alb  3.3  /  TBili  0.3  /  DBili  x   /  AST  23  /  ALT  33  /  AlkPhos  115  01-27    PT/INR - ( 27 Jan 2025 05:51 )   PT: 14.6 sec;   INR: 1.28 ratio         PTT - ( 27 Jan 2025 05:51 )  PTT:31.7 sec      Urinalysis Basic - ( 27 Jan 2025 05:51 )    Color: x / Appearance: x / SG: x / pH: x  Gluc: 206 mg/dL / Ketone: x  / Bili: x / Urobili: x   Blood: x / Protein: x / Nitrite: x   Leuk Esterase: x / RBC: x / WBC x   Sq Epi: x / Non Sq Epi: x / Bacteria: x        SARS-CoV-2: NotDetec (26 Jan 2025 12:56)      RADIOLOGY & ADDITIONAL TESTS:  New Imaging Personally Reviewed Today: Degenerative disc disease present in the cervical spine but no fractures. CT chest and abdomen and pelvis without a clear pneumonia, but does show mediastinal and adnexal masses.     MEDICATIONS  (STANDING):  acetaminophen     Tablet .. 975 milliGRAM(s) Oral every 8 hours  azithromycin   Tablet 500 milliGRAM(s) Oral every 24 hours  cefTRIAXone   IVPB      enoxaparin Injectable 40 milliGRAM(s) SubCutaneous every 24 hours  gabapentin 100 milliGRAM(s) Oral every 8 hours  ketorolac   Injectable 15 milliGRAM(s) IV Push every 6 hours  methocarbamol 750 milliGRAM(s) Oral three times a day  metoclopramide Injectable 10 milliGRAM(s) IV Push once  sodium chloride 3%  Inhalation 4 milliLiter(s) Inhalation every 12 hours    MEDICATIONS  (PRN):

## 2025-01-31 ENCOUNTER — TRANSCRIPTION ENCOUNTER (OUTPATIENT)
Age: 70
End: 2025-01-31

## 2025-01-31 LAB
CULTURE RESULTS: SIGNIFICANT CHANGE UP
CULTURE RESULTS: SIGNIFICANT CHANGE UP
HE 4: 68.5 PMOL/L — SIGNIFICANT CHANGE UP (ref 0–96.9)
SPECIMEN SOURCE: SIGNIFICANT CHANGE UP
SPECIMEN SOURCE: SIGNIFICANT CHANGE UP

## 2025-02-03 ENCOUNTER — TRANSCRIPTION ENCOUNTER (OUTPATIENT)
Age: 70
End: 2025-02-03

## 2025-02-10 ENCOUNTER — TRANSCRIPTION ENCOUNTER (OUTPATIENT)
Age: 70
End: 2025-02-10

## 2025-02-10 PROBLEM — G43.909 MIGRAINE, UNSPECIFIED, NOT INTRACTABLE, WITHOUT STATUS MIGRAINOSUS: Chronic | Status: ACTIVE | Noted: 2025-01-26

## 2025-02-10 PROBLEM — K56.609 UNSPECIFIED INTESTINAL OBSTRUCTION, UNSPECIFIED AS TO PARTIAL VERSUS COMPLETE OBSTRUCTION: Chronic | Status: ACTIVE | Noted: 2025-01-26

## 2025-02-12 ENCOUNTER — APPOINTMENT (OUTPATIENT)
Age: 70
End: 2025-02-12

## 2025-02-27 ENCOUNTER — TRANSCRIPTION ENCOUNTER (OUTPATIENT)
Age: 70
End: 2025-02-27
